# Patient Record
Sex: FEMALE | Race: WHITE
[De-identification: names, ages, dates, MRNs, and addresses within clinical notes are randomized per-mention and may not be internally consistent; named-entity substitution may affect disease eponyms.]

---

## 2017-01-05 ENCOUNTER — HOSPITAL ENCOUNTER (OUTPATIENT)
Dept: HOSPITAL 53 - SKLAB5 | Age: 62
End: 2017-01-05
Attending: FAMILY MEDICINE
Payer: MEDICAID

## 2017-01-05 DIAGNOSIS — R56.9: Primary | ICD-10-CM

## 2017-01-05 DIAGNOSIS — E11.9: ICD-10-CM

## 2017-01-05 DIAGNOSIS — D64.9: ICD-10-CM

## 2017-01-05 LAB
ALBUMIN SERPL BCG-MCNC: 3.5 GM/DL (ref 3.2–5.2)
ALBUMIN/GLOB SERPL: 0.83 {RATIO} (ref 1–1.93)
ALP SERPL-CCNC: 115 U/L (ref 45–117)
ALT SERPL W P-5'-P-CCNC: 23 U/L (ref 12–78)
ANION GAP SERPL CALC-SCNC: 12 MEQ/L (ref 8–16)
AST SERPL-CCNC: 19 U/L (ref 15–37)
BASOPHILS # BLD AUTO: 0 K/MM3 (ref 0–0.2)
BASOPHILS NFR BLD AUTO: 0.7 % (ref 0–1)
BILIRUB SERPL-MCNC: 0.5 MG/DL (ref 0.2–1)
BUN SERPL-MCNC: 12 MG/DL (ref 7–18)
CALCIUM SERPL-MCNC: 9 MG/DL (ref 8.8–10.2)
CHLORIDE SERPL-SCNC: 98 MEQ/L (ref 98–107)
CHOLEST SERPL-MCNC: 146 MG/DL (ref ?–200)
CO2 SERPL-SCNC: 26 MEQ/L (ref 21–32)
CREAT SERPL-MCNC: 0.76 MG/DL (ref 0.55–1.02)
EOSINOPHIL # BLD AUTO: 0.6 K/MM3 (ref 0–0.5)
EOSINOPHIL NFR BLD AUTO: 7.9 % (ref 0–3)
ERYTHROCYTE [DISTWIDTH] IN BLOOD BY AUTOMATED COUNT: 13.4 % (ref 11.5–14.5)
GFR SERPL CREATININE-BSD FRML MDRD: > 60 ML/MIN/{1.73_M2} (ref 45–?)
GLUCOSE SERPL-MCNC: 331 MG/DL (ref 80–110)
LARGE UNSTAINED CELL #: 0.2 K/MM3 (ref 0–0.4)
LARGE UNSTAINED CELL %: 2.9 % (ref 0–4)
LYMPHOCYTES # BLD AUTO: 2.4 K/MM3 (ref 1.5–4.5)
LYMPHOCYTES NFR BLD AUTO: 30.5 % (ref 24–44)
MCH RBC QN AUTO: 29 PG (ref 27–33)
MCHC RBC AUTO-ENTMCNC: 31 G/DL (ref 32–36.5)
MCV RBC AUTO: 93.6 FL (ref 80–96)
MONOCYTES # BLD AUTO: 0.4 K/MM3 (ref 0–0.8)
MONOCYTES NFR BLD AUTO: 5 % (ref 0–5)
NEUTROPHILS # BLD AUTO: 4.2 K/MM3 (ref 1.8–7.7)
NEUTROPHILS NFR BLD AUTO: 53 % (ref 36–66)
PLATELET # BLD AUTO: 220 K/MM3 (ref 150–450)
POTASSIUM SERPL-SCNC: 4.3 MEQ/L (ref 3.5–5.1)
PROT SERPL-MCNC: 7.7 GM/DL (ref 6.4–8.2)
SODIUM SERPL-SCNC: 136 MEQ/L (ref 136–145)
TRIGL SERPL-MCNC: 154 MG/DL (ref ?–150)
WBC # BLD AUTO: 7.9 K/MM3 (ref 4–10)

## 2017-01-27 ENCOUNTER — HOSPITAL ENCOUNTER (OUTPATIENT)
Dept: HOSPITAL 53 - SKLAB5 | Age: 62
Discharge: HOME | End: 2017-01-27
Attending: FAMILY MEDICINE
Payer: MEDICAID

## 2017-01-27 DIAGNOSIS — L03.116: Primary | ICD-10-CM

## 2017-04-04 ENCOUNTER — HOSPITAL ENCOUNTER (OUTPATIENT)
Dept: HOSPITAL 53 - SKLAB5 | Age: 62
End: 2017-04-04
Attending: FAMILY MEDICINE
Payer: MEDICAID

## 2017-04-04 DIAGNOSIS — L03.115: Primary | ICD-10-CM

## 2017-04-04 DIAGNOSIS — M54.42: ICD-10-CM

## 2017-04-04 LAB
ALBUMIN SERPL BCG-MCNC: 3.4 GM/DL (ref 3.2–5.2)
ALBUMIN/GLOB SERPL: 0.83 {RATIO} (ref 1–1.93)
ALP SERPL-CCNC: 124 U/L (ref 45–117)
ALT SERPL W P-5'-P-CCNC: 27 U/L (ref 12–78)
ANION GAP SERPL CALC-SCNC: 8 MEQ/L (ref 8–16)
AST SERPL-CCNC: 14 U/L (ref 15–37)
BASOPHILS # BLD AUTO: 0 K/MM3 (ref 0–0.2)
BASOPHILS NFR BLD AUTO: 0.5 % (ref 0–1)
BILIRUB SERPL-MCNC: 0.3 MG/DL (ref 0.2–1)
BUN SERPL-MCNC: 15 MG/DL (ref 7–18)
CALCIUM SERPL-MCNC: 8.7 MG/DL (ref 8.8–10.2)
CHLORIDE SERPL-SCNC: 100 MEQ/L (ref 98–107)
CO2 SERPL-SCNC: 28 MEQ/L (ref 21–32)
CREAT SERPL-MCNC: 0.81 MG/DL (ref 0.55–1.02)
EOSINOPHIL # BLD AUTO: 0.2 K/MM3 (ref 0–0.5)
EOSINOPHIL NFR BLD AUTO: 2.5 % (ref 0–3)
ERYTHROCYTE [DISTWIDTH] IN BLOOD BY AUTOMATED COUNT: 14 % (ref 11.5–14.5)
EST. AVERAGE GLUCOSE BLD GHB EST-MCNC: 252 MG/DL (ref 60–110)
GFR SERPL CREATININE-BSD FRML MDRD: > 60 ML/MIN/{1.73_M2} (ref 45–?)
GLUCOSE SERPL-MCNC: 380 MG/DL (ref 80–110)
LARGE UNSTAINED CELL #: 0.2 K/MM3 (ref 0–0.4)
LARGE UNSTAINED CELL %: 1.9 % (ref 0–4)
LYMPHOCYTES # BLD AUTO: 3.5 K/MM3 (ref 1.5–4.5)
LYMPHOCYTES NFR BLD AUTO: 38.7 % (ref 24–44)
MCH RBC QN AUTO: 29 PG (ref 27–33)
MCHC RBC AUTO-ENTMCNC: 31.4 G/DL (ref 32–36.5)
MCV RBC AUTO: 92.4 FL (ref 80–96)
MONOCYTES # BLD AUTO: 0.4 K/MM3 (ref 0–0.8)
MONOCYTES NFR BLD AUTO: 4.8 % (ref 0–5)
NEUTROPHILS # BLD AUTO: 4.4 K/MM3 (ref 1.8–7.7)
NEUTROPHILS NFR BLD AUTO: 51.6 % (ref 36–66)
PLATELET # BLD AUTO: 204 K/MM3 (ref 150–450)
POTASSIUM SERPL-SCNC: 4 MEQ/L (ref 3.5–5.1)
PROT SERPL-MCNC: 7.5 GM/DL (ref 6.4–8.2)
SODIUM SERPL-SCNC: 136 MEQ/L (ref 136–145)
WBC # BLD AUTO: 8.5 K/MM3 (ref 4–10)

## 2017-05-13 ENCOUNTER — HOSPITAL ENCOUNTER (EMERGENCY)
Dept: HOSPITAL 53 - M ED | Age: 62
Discharge: HOME | End: 2017-05-13
Payer: MEDICAID

## 2017-05-13 VITALS — BODY MASS INDEX: 31.98 KG/M2 | HEIGHT: 66 IN | WEIGHT: 199 LBS

## 2017-05-13 VITALS — DIASTOLIC BLOOD PRESSURE: 84 MMHG | SYSTOLIC BLOOD PRESSURE: 154 MMHG

## 2017-05-13 DIAGNOSIS — E11.9: ICD-10-CM

## 2017-05-13 DIAGNOSIS — Z79.4: ICD-10-CM

## 2017-05-13 DIAGNOSIS — R04.0: Primary | ICD-10-CM

## 2017-05-13 DIAGNOSIS — I10: ICD-10-CM

## 2017-05-13 DIAGNOSIS — Z79.82: ICD-10-CM

## 2017-05-13 DIAGNOSIS — Z79.891: ICD-10-CM

## 2017-05-13 DIAGNOSIS — I51.9: ICD-10-CM

## 2017-05-13 DIAGNOSIS — Z88.1: ICD-10-CM

## 2017-05-13 DIAGNOSIS — Z79.899: ICD-10-CM

## 2017-05-13 LAB
ANION GAP SERPL CALC-SCNC: 7 MEQ/L (ref 8–16)
BUN SERPL-MCNC: 14 MG/DL (ref 7–18)
CALCIUM SERPL-MCNC: 8.7 MG/DL (ref 8.8–10.2)
CHLORIDE SERPL-SCNC: 101 MEQ/L (ref 98–107)
CO2 SERPL-SCNC: 28 MEQ/L (ref 21–32)
CREAT SERPL-MCNC: 0.7 MG/DL (ref 0.55–1.02)
ERYTHROCYTE [DISTWIDTH] IN BLOOD BY AUTOMATED COUNT: 14.2 % (ref 11.5–14.5)
GFR SERPL CREATININE-BSD FRML MDRD: > 60 ML/MIN/{1.73_M2} (ref 45–?)
GLUCOSE SERPL-MCNC: 274 MG/DL (ref 80–110)
INR PPP: 0.87
MCH RBC QN AUTO: 28.6 PG (ref 27–33)
MCHC RBC AUTO-ENTMCNC: 31.5 G/DL (ref 32–36.5)
MCV RBC AUTO: 90.6 FL (ref 80–96)
PLATELET # BLD AUTO: 181 K/MM3 (ref 150–450)
POTASSIUM SERPL-SCNC: 4.2 MEQ/L (ref 3.5–5.1)
SODIUM SERPL-SCNC: 136 MEQ/L (ref 136–145)
WBC # BLD AUTO: 9.4 K/MM3 (ref 4–10)

## 2017-07-06 ENCOUNTER — HOSPITAL ENCOUNTER (OUTPATIENT)
Dept: HOSPITAL 53 - SKLAB5 | Age: 62
End: 2017-07-06
Attending: FAMILY MEDICINE
Payer: MEDICAID

## 2017-07-06 DIAGNOSIS — R56.9: ICD-10-CM

## 2017-07-06 DIAGNOSIS — E11.9: ICD-10-CM

## 2017-07-06 DIAGNOSIS — E78.5: Primary | ICD-10-CM

## 2017-07-06 LAB
ALBUMIN SERPL BCG-MCNC: 3.4 GM/DL (ref 3.2–5.2)
ALBUMIN/GLOB SERPL: 0.83 {RATIO} (ref 1–1.93)
ALP SERPL-CCNC: 114 U/L (ref 45–117)
ALT SERPL W P-5'-P-CCNC: 30 U/L (ref 12–78)
ANION GAP SERPL CALC-SCNC: 10 MEQ/L (ref 8–16)
AST SERPL-CCNC: 18 U/L (ref 15–37)
BASOPHILS # BLD AUTO: 0 K/MM3 (ref 0–0.2)
BASOPHILS NFR BLD AUTO: 0.6 % (ref 0–1)
BILIRUB SERPL-MCNC: 0.3 MG/DL (ref 0.2–1)
BUN SERPL-MCNC: 15 MG/DL (ref 7–18)
CALCIUM SERPL-MCNC: 8.8 MG/DL (ref 8.8–10.2)
CHLORIDE SERPL-SCNC: 98 MEQ/L (ref 98–107)
CHOLEST SERPL-MCNC: 146 MG/DL (ref ?–200)
CO2 SERPL-SCNC: 26 MEQ/L (ref 21–32)
CREAT SERPL-MCNC: 0.79 MG/DL (ref 0.55–1.02)
EOSINOPHIL # BLD AUTO: 0.2 K/MM3 (ref 0–0.5)
EOSINOPHIL NFR BLD AUTO: 2.9 % (ref 0–3)
ERYTHROCYTE [DISTWIDTH] IN BLOOD BY AUTOMATED COUNT: 14.2 % (ref 11.5–14.5)
EST. AVERAGE GLUCOSE BLD GHB EST-MCNC: 229 MG/DL (ref 60–110)
GFR SERPL CREATININE-BSD FRML MDRD: > 60 ML/MIN/{1.73_M2} (ref 45–?)
GLUCOSE SERPL-MCNC: 354 MG/DL (ref 80–110)
LARGE UNSTAINED CELL #: 0.2 K/MM3 (ref 0–0.4)
LARGE UNSTAINED CELL %: 2.2 % (ref 0–4)
LYMPHOCYTES # BLD AUTO: 2.6 K/MM3 (ref 1.5–4.5)
LYMPHOCYTES NFR BLD AUTO: 31.6 % (ref 24–44)
MCH RBC QN AUTO: 29.7 PG (ref 27–33)
MCHC RBC AUTO-ENTMCNC: 31.8 G/DL (ref 32–36.5)
MCV RBC AUTO: 93.2 FL (ref 80–96)
MONOCYTES # BLD AUTO: 0.4 K/MM3 (ref 0–0.8)
MONOCYTES NFR BLD AUTO: 5.1 % (ref 0–5)
NEUTROPHILS # BLD AUTO: 4.7 K/MM3 (ref 1.8–7.7)
NEUTROPHILS NFR BLD AUTO: 57.6 % (ref 36–66)
PLATELET # BLD AUTO: 202 K/MM3 (ref 150–450)
POTASSIUM SERPL-SCNC: 4.2 MEQ/L (ref 3.5–5.1)
PROT SERPL-MCNC: 7.5 GM/DL (ref 6.4–8.2)
SODIUM SERPL-SCNC: 134 MEQ/L (ref 136–145)
TRIGL SERPL-MCNC: 221 MG/DL (ref ?–150)
WBC # BLD AUTO: 8.1 K/MM3 (ref 4–10)

## 2017-07-27 ENCOUNTER — HOSPITAL ENCOUNTER (OUTPATIENT)
Dept: HOSPITAL 53 - SKLAB5 | Age: 62
End: 2017-07-27
Attending: FAMILY MEDICINE
Payer: MEDICAID

## 2017-07-27 DIAGNOSIS — D64.9: Primary | ICD-10-CM

## 2017-07-27 LAB
BASOPHILS # BLD AUTO: 0 K/MM3 (ref 0–0.2)
BASOPHILS NFR BLD AUTO: 0.5 % (ref 0–1)
EOSINOPHIL # BLD AUTO: 0.2 K/MM3 (ref 0–0.5)
EOSINOPHIL NFR BLD AUTO: 2.1 % (ref 0–3)
ERYTHROCYTE [DISTWIDTH] IN BLOOD BY AUTOMATED COUNT: 14.1 % (ref 11.5–14.5)
LARGE UNSTAINED CELL #: 0.2 K/MM3 (ref 0–0.4)
LARGE UNSTAINED CELL %: 1.9 % (ref 0–4)
LYMPHOCYTES # BLD AUTO: 2.4 K/MM3 (ref 1.5–4.5)
LYMPHOCYTES NFR BLD AUTO: 26.3 % (ref 24–44)
MCH RBC QN AUTO: 29.3 PG (ref 27–33)
MCHC RBC AUTO-ENTMCNC: 32 G/DL (ref 32–36.5)
MCV RBC AUTO: 91.4 FL (ref 80–96)
MONOCYTES # BLD AUTO: 0.4 K/MM3 (ref 0–0.8)
MONOCYTES NFR BLD AUTO: 4.7 % (ref 0–5)
NEUTROPHILS # BLD AUTO: 5.9 K/MM3 (ref 1.8–7.7)
NEUTROPHILS NFR BLD AUTO: 64.4 % (ref 36–66)
PLATELET # BLD AUTO: 198 K/MM3 (ref 150–450)
WBC # BLD AUTO: 9.2 K/MM3 (ref 4–10)

## 2017-08-01 ENCOUNTER — HOSPITAL ENCOUNTER (OUTPATIENT)
Dept: HOSPITAL 53 - SKLAB5 | Age: 62
End: 2017-08-01
Attending: FAMILY MEDICINE
Payer: MEDICAID

## 2017-08-01 ENCOUNTER — HOSPITAL ENCOUNTER (OUTPATIENT)
Dept: HOSPITAL 53 - M RAD | Age: 62
End: 2017-08-01
Attending: FAMILY MEDICINE
Payer: MEDICAID

## 2017-08-01 DIAGNOSIS — Z12.31: Primary | ICD-10-CM

## 2017-08-01 DIAGNOSIS — R10.9: Primary | ICD-10-CM

## 2017-08-01 LAB
BASOPHILS # BLD AUTO: 0 K/MM3 (ref 0–0.2)
BASOPHILS NFR BLD AUTO: 0.5 % (ref 0–1)
EOSINOPHIL # BLD AUTO: 0.2 K/MM3 (ref 0–0.5)
EOSINOPHIL NFR BLD AUTO: 2.6 % (ref 0–3)
ERYTHROCYTE [DISTWIDTH] IN BLOOD BY AUTOMATED COUNT: 14.2 % (ref 11.5–14.5)
LARGE UNSTAINED CELL #: 0.2 K/MM3 (ref 0–0.4)
LARGE UNSTAINED CELL %: 2.5 % (ref 0–4)
LYMPHOCYTES # BLD AUTO: 3.1 K/MM3 (ref 1.5–4.5)
LYMPHOCYTES NFR BLD AUTO: 34.7 % (ref 24–44)
MCH RBC QN AUTO: 29.4 PG (ref 27–33)
MCHC RBC AUTO-ENTMCNC: 32.3 G/DL (ref 32–36.5)
MCV RBC AUTO: 90.9 FL (ref 80–96)
MONOCYTES # BLD AUTO: 0.5 K/MM3 (ref 0–0.8)
MONOCYTES NFR BLD AUTO: 5 % (ref 0–5)
NEUTROPHILS # BLD AUTO: 4.9 K/MM3 (ref 1.8–7.7)
NEUTROPHILS NFR BLD AUTO: 54.6 % (ref 36–66)
PLATELET # BLD AUTO: 194 K/MM3 (ref 150–450)
WBC # BLD AUTO: 9 K/MM3 (ref 4–10)

## 2017-08-01 NOTE — REPMRS
Patient History

The patient states she has not had a clinical breast exam in over

a year. No known family history of cancer.

 

Digital Mammo Screening Bilat: August 1, 2017 - Exam #: 

[id number]

Bilateral CC and MLO view(s) were taken.

 

Technologist: Laura Pearl, Technologist

No prior studies available for comparison.

 

FINDINGS: There are scattered fibroglandular densities.  There is

a mild amount of residual fibroglandular tissue which is fairly 

symmetric. There is no dominant mass, architectural distortion, 

or clustered microcalcification suggestive of malignancy. Minor 

tissue asymmetry in right breast as benign finding. Priors from 

2002 at [facility] no longer available.

 

ASSESSMENT: BI-RADS/ACR category 2 mammogram. Benign finding(s).

 

Recommendation

Routine screening mammogram in 1 year (for women over age 40).

This mammogram was interpreted with the aid of an FDA-approved 

computer-aided dectection system.

A. Negative x-ray reports should not delay biopsy if a dominant 

or clinically suspicious mass is present.

B. Four to eight percent of cancers are not identified by 

mammography.

C. Adenosis and dense breast may obscure an underlying neoplasm.

 

Electronically Signed By: Rhys Bond MD 08/01/17 0162

## 2017-08-02 ENCOUNTER — HOSPITAL ENCOUNTER (OUTPATIENT)
Dept: HOSPITAL 53 - SKLAB5 | Age: 62
End: 2017-08-02
Attending: FAMILY MEDICINE
Payer: MEDICAID

## 2017-08-02 DIAGNOSIS — R10.9: Primary | ICD-10-CM

## 2017-08-02 NOTE — REP
KUB, ONE VIEW:

 

HISTORY:  Abdominal pain.

 

COMPARISON:  06/08/2011.

 

A small amount of air is present in the small and large intestine.  There are no

fluid levels or dilated loops of intestine.  There is no pneumoperitoneum.

Degenerative change is present in the lumbar spine.

 

IMPRESSION:

 

Nonspecific bowel gas pattern.

 

 

Signed by

Prince Brunson MD 08/02/2017 10:34 A

## 2017-08-15 ENCOUNTER — HOSPITAL ENCOUNTER (OUTPATIENT)
Dept: HOSPITAL 53 - SKLAB5 | Age: 62
End: 2017-08-15
Attending: FAMILY MEDICINE
Payer: MEDICAID

## 2017-08-15 DIAGNOSIS — R31.9: Primary | ICD-10-CM

## 2017-09-03 ENCOUNTER — HOSPITAL ENCOUNTER (OUTPATIENT)
Dept: HOSPITAL 53 - SKLAB5 | Age: 62
End: 2017-09-03
Attending: FAMILY MEDICINE
Payer: MEDICAID

## 2017-09-03 DIAGNOSIS — I10: ICD-10-CM

## 2017-09-03 DIAGNOSIS — E11.9: Primary | ICD-10-CM

## 2017-09-11 ENCOUNTER — HOSPITAL ENCOUNTER (OUTPATIENT)
Dept: HOSPITAL 53 - SKLAB5 | Age: 62
End: 2017-09-11
Attending: FAMILY MEDICINE
Payer: MEDICAID

## 2017-09-11 ENCOUNTER — HOSPITAL ENCOUNTER (OUTPATIENT)
Dept: HOSPITAL 53 - M RAD | Age: 62
End: 2017-09-11
Attending: NURSE PRACTITIONER
Payer: MEDICAID

## 2017-09-11 DIAGNOSIS — M79.661: Primary | ICD-10-CM

## 2017-09-11 DIAGNOSIS — M79.89: ICD-10-CM

## 2017-09-11 DIAGNOSIS — R79.89: Primary | ICD-10-CM

## 2017-09-11 LAB
ERYTHROCYTE [DISTWIDTH] IN BLOOD BY AUTOMATED COUNT: 14.1 % (ref 11.5–14.5)
ERYTHROCYTE [SEDIMENTATION RATE] IN BLOOD BY WESTERGREN METHOD: 19 MM/HR (ref 0–30)
MCH RBC QN AUTO: 29.5 PG (ref 27–33)
MCHC RBC AUTO-ENTMCNC: 32.3 G/DL (ref 32–36.5)
MCV RBC AUTO: 91.3 FL (ref 80–96)
PLATELET # BLD AUTO: 202 K/MM3 (ref 150–450)
WBC # BLD AUTO: 8.8 K/MM3 (ref 4–10)

## 2017-09-11 NOTE — REP
RIGHT LOWER EXTREMITY DOPPLER VENOUS ULTRASOUND:  09/11/2017.

 

Comparison: 02/05/2015, 04/05/2014.

 

Technique:  The deep venous system of the right lower extremity is evaluated with

gray scale imaging, compression ultrasound, color imaging and duplex Doppler

interrogation.  Examination from the groin through the popliteal fossa into the

proximal calf. Exam is limited due to significant lower extremity swelling.  The

distal SFV is difficult to visualize

 

Findings: There is full compressibility from the common femoral vein in the

inguinal region through the popliteal vein.  Color imaging confirms patency

throughout the course of the deep venous system.  There is respiratory variation

and augmented flow at all levels, although there is difficulty visualizing the

distal femoral vein above the popliteal due to swelling.

 

Impression:

1.  No Doppler venous ultrasound evidence of DVT in the right lower extremity,

exam limited for reasons stated above.  No gross evidence of DVT.

 

 

Signed by

Rhys Bond MD 09/11/2017 02:32 P

## 2017-10-05 ENCOUNTER — HOSPITAL ENCOUNTER (OUTPATIENT)
Dept: HOSPITAL 53 - SKLAB5 | Age: 62
End: 2017-10-05
Attending: FAMILY MEDICINE
Payer: MEDICAID

## 2017-10-05 DIAGNOSIS — E11.9: Primary | ICD-10-CM

## 2017-10-05 LAB — EST. AVERAGE GLUCOSE BLD GHB EST-MCNC: 226 MG/DL (ref 60–110)

## 2017-11-24 ENCOUNTER — HOSPITAL ENCOUNTER (OUTPATIENT)
Dept: HOSPITAL 53 - SKLAB5 | Age: 62
End: 2017-11-24
Attending: FAMILY MEDICINE
Payer: MEDICAID

## 2017-11-24 DIAGNOSIS — R60.9: ICD-10-CM

## 2017-11-24 DIAGNOSIS — I73.9: Primary | ICD-10-CM

## 2017-11-24 LAB
ANION GAP SERPL CALC-SCNC: 9 MEQ/L (ref 8–16)
BUN SERPL-MCNC: 14 MG/DL (ref 7–18)
CALCIUM SERPL-MCNC: 8.9 MG/DL (ref 8.8–10.2)
CHLORIDE SERPL-SCNC: 102 MEQ/L (ref 98–107)
CO2 SERPL-SCNC: 26 MEQ/L (ref 21–32)
CREAT SERPL-MCNC: 0.72 MG/DL (ref 0.55–1.02)
GFR SERPL CREATININE-BSD FRML MDRD: > 60 ML/MIN/{1.73_M2} (ref 45–?)
GLUCOSE SERPL-MCNC: 250 MG/DL (ref 80–110)
POTASSIUM SERPL-SCNC: 4.3 MEQ/L (ref 3.5–5.1)
SODIUM SERPL-SCNC: 137 MEQ/L (ref 136–145)

## 2017-12-01 ENCOUNTER — HOSPITAL ENCOUNTER (OUTPATIENT)
Dept: HOSPITAL 53 - SKLAB5 | Age: 62
End: 2017-12-01
Attending: FAMILY MEDICINE
Payer: MEDICAID

## 2017-12-01 DIAGNOSIS — E11.9: Primary | ICD-10-CM

## 2017-12-05 ENCOUNTER — HOSPITAL ENCOUNTER (EMERGENCY)
Dept: HOSPITAL 53 - M ED | Age: 62
Discharge: HOME | End: 2017-12-05
Payer: MEDICAID

## 2017-12-05 VITALS — DIASTOLIC BLOOD PRESSURE: 70 MMHG | SYSTOLIC BLOOD PRESSURE: 131 MMHG

## 2017-12-05 VITALS — WEIGHT: 197.42 LBS | HEIGHT: 64 IN | BODY MASS INDEX: 33.7 KG/M2

## 2017-12-05 DIAGNOSIS — Z79.82: ICD-10-CM

## 2017-12-05 DIAGNOSIS — Z79.4: ICD-10-CM

## 2017-12-05 DIAGNOSIS — R04.0: Primary | ICD-10-CM

## 2017-12-05 DIAGNOSIS — M19.90: ICD-10-CM

## 2017-12-05 DIAGNOSIS — Z95.1: ICD-10-CM

## 2017-12-05 DIAGNOSIS — G81.91: ICD-10-CM

## 2017-12-05 DIAGNOSIS — I25.10: ICD-10-CM

## 2017-12-05 DIAGNOSIS — Z79.899: ICD-10-CM

## 2017-12-05 DIAGNOSIS — Z88.8: ICD-10-CM

## 2017-12-05 DIAGNOSIS — R56.9: ICD-10-CM

## 2017-12-05 DIAGNOSIS — I25.2: ICD-10-CM

## 2017-12-05 DIAGNOSIS — J45.909: ICD-10-CM

## 2017-12-05 DIAGNOSIS — Z87.442: ICD-10-CM

## 2017-12-05 DIAGNOSIS — F31.9: ICD-10-CM

## 2017-12-05 DIAGNOSIS — E11.9: ICD-10-CM

## 2017-12-05 DIAGNOSIS — I10: ICD-10-CM

## 2017-12-05 LAB
ANION GAP SERPL CALC-SCNC: 7 MEQ/L (ref 8–16)
BASOPHILS # BLD AUTO: 0 10^3/UL (ref 0–0.2)
BASOPHILS NFR BLD AUTO: 0.4 % (ref 0–1)
BUN SERPL-MCNC: 18 MG/DL (ref 7–18)
CALCIUM SERPL-MCNC: 9.1 MG/DL (ref 8.8–10.2)
CHLORIDE SERPL-SCNC: 102 MEQ/L (ref 98–107)
CO2 SERPL-SCNC: 30 MEQ/L (ref 21–32)
CREAT SERPL-MCNC: 0.71 MG/DL (ref 0.55–1.02)
EOSINOPHIL # BLD AUTO: 0.2 10^3/UL (ref 0–0.5)
EOSINOPHIL NFR BLD AUTO: 2 % (ref 0–3)
ERYTHROCYTE [DISTWIDTH] IN BLOOD BY AUTOMATED COUNT: 14.4 % (ref 11.5–14.5)
GFR SERPL CREATININE-BSD FRML MDRD: > 60 ML/MIN/{1.73_M2} (ref 45–?)
GLUCOSE SERPL-MCNC: 126 MG/DL (ref 80–110)
IMM GRANULOCYTES NFR BLD: 0.4 % (ref 0–0)
INR PPP: 0.92
LYMPHOCYTES # BLD AUTO: 3.3 10^3/UL (ref 1.5–4.5)
LYMPHOCYTES NFR BLD AUTO: 31.4 % (ref 24–44)
MCH RBC QN AUTO: 28.6 PG (ref 27–33)
MCHC RBC AUTO-ENTMCNC: 32.1 G/DL (ref 32–36.5)
MCV RBC AUTO: 89.3 FL (ref 80–96)
MONOCYTES # BLD AUTO: 0.7 10^3/UL (ref 0–0.8)
MONOCYTES NFR BLD AUTO: 7 % (ref 0–5)
NEUTROPHILS # BLD AUTO: 6.1 10^3/UL (ref 1.8–7.7)
NEUTROPHILS NFR BLD AUTO: 58.8 % (ref 36–66)
NRBC BLD AUTO-RTO: 0 % (ref 0–0)
PLATELET # BLD AUTO: 199 10^3/UL (ref 150–450)
POTASSIUM SERPL-SCNC: 4.1 MEQ/L (ref 3.5–5.1)
SODIUM SERPL-SCNC: 139 MEQ/L (ref 136–145)
WBC # BLD AUTO: 10.4 10^3/UL (ref 4–10)

## 2018-01-04 ENCOUNTER — HOSPITAL ENCOUNTER (OUTPATIENT)
Dept: HOSPITAL 53 - SKLAB5 | Age: 63
End: 2018-01-04
Attending: FAMILY MEDICINE
Payer: MEDICAID

## 2018-01-04 DIAGNOSIS — J44.9: Primary | ICD-10-CM

## 2018-01-04 DIAGNOSIS — R56.9: ICD-10-CM

## 2018-01-04 DIAGNOSIS — E11.9: ICD-10-CM

## 2018-01-04 LAB
ALBUMIN/GLOBULIN RATIO: 0.8 (ref 1–1.93)
ALBUMIN: 3.5 GM/DL (ref 3.2–5.2)
ALKALINE PHOSPHATASE: 91 U/L (ref 45–117)
ALT SERPL W P-5'-P-CCNC: 25 U/L (ref 12–78)
ANION GAP: 10 MEQ/L (ref 8–16)
AST SERPL-CCNC: 26 U/L (ref 7–37)
BASO #: 0.1 10^3/UL (ref 0–0.2)
BASO %: 0.6 % (ref 0–1)
BILIRUBIN,TOTAL: 0.2 MG/DL (ref 0.2–1)
BLOOD UREA NITROGEN: 15 MG/DL (ref 7–18)
CALCIUM LEVEL: 9.1 MG/DL (ref 8.8–10.2)
CARBON DIOXIDE LEVEL: 26 MEQ/L (ref 21–32)
CHLORIDE LEVEL: 102 MEQ/L (ref 98–107)
CHOLEST SERPL-MCNC: 123 MG/DL (ref ?–200)
CHOLESTEROL RISK RATIO: 2.62 (ref ?–5)
CREATININE FOR GFR: 0.68 MG/DL (ref 0.55–1.02)
EOS #: 0.3 10^3/UL (ref 0–0.5)
EOSINOPHIL NFR BLD AUTO: 3.3 % (ref 0–3)
EST. AVERAGE GLUCOSE BLD GHB EST-MCNC: 177 MG/DL (ref 60–110)
GFR SERPL CREATININE-BSD FRML MDRD: > 60 ML/MIN/{1.73_M2} (ref 45–?)
GLUCOSE, FASTING: 226 MG/DL (ref 80–110)
HDLC SERPL-MCNC: 47 MG/DL (ref 40–?)
HEMATOCRIT: 40.5 % (ref 36–47)
HEMOGLOBIN: 12.8 G/DL (ref 12–16)
IMMATURE GRANULOCYTE #: 0.1 10^3/UL (ref 0–0)
IMMATURE GRANULOCYTE %: 0.7 % (ref 0–0)
LDL CHOLESTEROL: 50.2 MG/DL (ref ?–100)
LYMPH #: 3.9 10^3/UL (ref 1.5–4.5)
LYMPH %: 38.6 % (ref 24–44)
MEAN CORPUSCULAR HEMOGLOBIN: 27.7 PG (ref 27–33)
MEAN CORPUSCULAR HGB CONC: 31.6 G/DL (ref 32–36.5)
MEAN CORPUSCULAR VOLUME: 87.7 FL (ref 80–96)
MONO #: 0.5 10^3/UL (ref 0–0.8)
MONO %: 5 % (ref 0–5)
NEUTROPHILS #: 5.2 10^3/UL (ref 1.8–7.7)
NEUTROPHILS %: 51.8 % (ref 36–66)
NONHDLC SERPL-MCNC: 76 MG/DL
NRBC BLD AUTO-RTO: 0 % (ref 0–0)
PLATELET COUNT, AUTOMATED: 260 10^3/UL (ref 150–450)
POTASSIUM SERUM: 4.3 MEQ/L (ref 3.5–5.1)
RED BLOOD COUNT: 4.62 10^6/UL (ref 4–5.4)
RED CELL DISTRIBUTION WIDTH: 14.4 % (ref 11.5–14.5)
SODIUM LEVEL: 138 MEQ/L (ref 136–145)
TOTAL PROTEIN: 7.9 GM/DL (ref 6.4–8.2)
TRIGLYCERIDES LEVEL: 129 MG/DL (ref ?–150)
WHITE BLOOD COUNT: 10.1 10^3/UL (ref 4–10)

## 2018-01-04 PROCEDURE — 80053 COMPREHEN METABOLIC PANEL: CPT

## 2018-04-05 ENCOUNTER — HOSPITAL ENCOUNTER (OUTPATIENT)
Dept: HOSPITAL 53 - SKLAB5 | Age: 63
End: 2018-04-05
Attending: FAMILY MEDICINE
Payer: MEDICAID

## 2018-04-05 DIAGNOSIS — E11.9: ICD-10-CM

## 2018-04-05 DIAGNOSIS — R56.9: Primary | ICD-10-CM

## 2018-04-05 DIAGNOSIS — I25.10: ICD-10-CM

## 2018-04-05 LAB
EST. AVERAGE GLUCOSE BLD GHB EST-MCNC: 180 MG/DL (ref 60–110)
VALPROIC ACID (DEPAKOTE): 85.3 UG/ML (ref 50–100)

## 2018-04-05 PROCEDURE — 80164 ASSAY DIPROPYLACETIC ACD TOT: CPT

## 2018-04-23 ENCOUNTER — HOSPITAL ENCOUNTER (OUTPATIENT)
Dept: HOSPITAL 53 - SKLAB5 | Age: 63
End: 2018-04-23
Attending: FAMILY MEDICINE
Payer: MEDICAID

## 2018-04-23 DIAGNOSIS — R19.7: Primary | ICD-10-CM

## 2018-04-23 PROCEDURE — 87507 IADNA-DNA/RNA PROBE TQ 12-25: CPT

## 2018-06-26 ENCOUNTER — HOSPITAL ENCOUNTER (OUTPATIENT)
Dept: HOSPITAL 53 - SKLAB5 | Age: 63
End: 2018-06-26
Attending: FAMILY MEDICINE
Payer: MEDICAID

## 2018-06-26 DIAGNOSIS — M25.431: Primary | ICD-10-CM

## 2018-06-26 LAB
BASO #: 0 10^3/UL (ref 0–0.2)
BASO %: 0.3 % (ref 0–1)
CRP SERPL-MCNC: 1.5 MG/DL (ref 0–0.3)
EOS #: 0 10^3/UL (ref 0–0.5)
EOSINOPHIL NFR BLD AUTO: 0 % (ref 0–3)
ERYTHROCYTE SEDIMENTATION RATE: 14 MM/HR (ref 0–30)
HEMATOCRIT: 42.1 % (ref 36–47)
HEMOGLOBIN: 13.2 G/DL (ref 12–15.5)
IMMATURE GRANULOCYTE %: 0.6 % (ref 0–3)
LYMPH #: 2.5 10^3/UL (ref 1.5–4.5)
LYMPH %: 16.2 % (ref 24–44)
MEAN CORPUSCULAR HEMOGLOBIN: 27.3 PG (ref 27–33)
MEAN CORPUSCULAR HGB CONC: 31.4 G/DL (ref 32–36.5)
MEAN CORPUSCULAR VOLUME: 87 FL (ref 80–96)
MONO #: 0.7 10^3/UL (ref 0–0.8)
MONO %: 4.5 % (ref 0–5)
NEUTROPHILS #: 12.1 10^3/UL (ref 1.8–7.7)
NEUTROPHILS %: 78.4 % (ref 36–66)
NRBC BLD AUTO-RTO: 0 % (ref 0–0)
PLATELET COUNT, AUTOMATED: 238 10^3/UL (ref 150–450)
RED BLOOD COUNT: 4.84 10^6/UL (ref 4–5.4)
RED CELL DISTRIBUTION WIDTH: 15.6 % (ref 11.5–14.5)
URIC ACID: 5.6 MG/DL (ref 2.6–6)
WHITE BLOOD COUNT: 15.5 10^3/UL (ref 4–10)

## 2018-06-26 PROCEDURE — 84550 ASSAY OF BLOOD/URIC ACID: CPT

## 2018-06-26 RX ADMIN — LIDOCAINE HYDROCHLORIDE 1 ML: 10 INJECTION, SOLUTION INFILTRATION; PERINEURAL at 22:00

## 2018-06-27 ENCOUNTER — HOSPITAL ENCOUNTER (INPATIENT)
Dept: HOSPITAL 53 - M MS5PR | Age: 63
LOS: 9 days | Discharge: NURSING, REHAB OR LONG-TERM CARE | DRG: 720 | End: 2018-07-06
Attending: FAMILY MEDICINE
Payer: MEDICAID

## 2018-06-27 DIAGNOSIS — Z79.4: ICD-10-CM

## 2018-06-27 DIAGNOSIS — A41.9: Primary | ICD-10-CM

## 2018-06-27 DIAGNOSIS — E78.5: ICD-10-CM

## 2018-06-27 DIAGNOSIS — Z79.899: ICD-10-CM

## 2018-06-27 DIAGNOSIS — L03.113: ICD-10-CM

## 2018-06-27 DIAGNOSIS — I69.951: ICD-10-CM

## 2018-06-27 DIAGNOSIS — G40.909: ICD-10-CM

## 2018-06-27 DIAGNOSIS — R65.20: ICD-10-CM

## 2018-06-27 DIAGNOSIS — F03.90: ICD-10-CM

## 2018-06-27 DIAGNOSIS — I69.322: ICD-10-CM

## 2018-06-27 DIAGNOSIS — J44.9: ICD-10-CM

## 2018-06-27 DIAGNOSIS — R91.8: ICD-10-CM

## 2018-06-27 DIAGNOSIS — L97.929: ICD-10-CM

## 2018-06-27 DIAGNOSIS — E11.40: ICD-10-CM

## 2018-06-27 DIAGNOSIS — Z88.8: ICD-10-CM

## 2018-06-27 DIAGNOSIS — I10: ICD-10-CM

## 2018-06-27 DIAGNOSIS — F31.9: ICD-10-CM

## 2018-06-27 LAB
ALBUMIN/GLOBULIN RATIO: 0.62 (ref 1–1.93)
ALBUMIN: 3.2 GM/DL (ref 3.2–5.2)
ALKALINE PHOSPHATASE: 82 U/L (ref 45–117)
ALT SERPL W P-5'-P-CCNC: 20 U/L (ref 12–78)
ANION GAP: 8 MEQ/L (ref 8–16)
AST SERPL-CCNC: 12 U/L (ref 7–37)
BASO #: 0 10^3/UL (ref 0–0.2)
BASO %: 0.2 % (ref 0–1)
BILIRUBIN,TOTAL: 0.5 MG/DL (ref 0.2–1)
BLOOD UREA NITROGEN: 21 MG/DL (ref 7–18)
CALCIUM LEVEL: 8.9 MG/DL (ref 8.8–10.2)
CARBON DIOXIDE LEVEL: 29 MEQ/L (ref 21–32)
CHLORIDE LEVEL: 99 MEQ/L (ref 98–107)
CREATININE FOR GFR: 0.82 MG/DL (ref 0.55–1.3)
CRP SERPL-MCNC: 9.92 MG/DL (ref 0–0.3)
EOS #: 0.1 10^3/UL (ref 0–0.5)
EOSINOPHIL NFR BLD AUTO: 0.4 % (ref 0–3)
ERYTHROCYTE SEDIMENTATION RATE: 34 MM/HR (ref 0–30)
GFR SERPL CREATININE-BSD FRML MDRD: > 60 ML/MIN/{1.73_M2} (ref 45–?)
GLUCOSE BLDC GLUCOMTR-MCNC: 199 MG/DL (ref 80–115)
GLUCOSE, FASTING: 175 MG/DL (ref 70–100)
HEMATOCRIT: 42.9 % (ref 36–47)
HEMOGLOBIN: 13.7 G/DL (ref 12–15.5)
IMMATURE GRANULOCYTE %: 0.4 % (ref 0–3)
LACTIC ACID SEPSIS PROTOCOL: 1.8 MMOL/L (ref 0.4–2)
LACTIC ACID SEPSIS PROTOCOL: 2.6 MMOL/L (ref 0.4–2)
LEUKOCYTE ESTERASE UR AUTO RFX: NEGATIVE
LYMPH #: 2.7 10^3/UL (ref 1.5–4.5)
LYMPH %: 14.7 % (ref 24–44)
MEAN CORPUSCULAR HEMOGLOBIN: 27 PG (ref 27–33)
MEAN CORPUSCULAR HGB CONC: 31.9 G/DL (ref 32–36.5)
MEAN CORPUSCULAR VOLUME: 84.6 FL (ref 80–96)
MONO #: 1.2 10^3/UL (ref 0–0.8)
MONO %: 6.7 % (ref 0–5)
NEUTROPHILS #: 14.1 10^3/UL (ref 1.8–7.7)
NEUTROPHILS %: 77.6 % (ref 36–66)
NRBC BLD AUTO-RTO: 0 % (ref 0–0)
PLATELET COUNT, AUTOMATED: 219 10^3/UL (ref 150–450)
POTASSIUM SERUM: 3.8 MEQ/L (ref 3.5–5.1)
RED BLOOD COUNT: 5.07 10^6/UL (ref 4–5.4)
RED CELL DISTRIBUTION WIDTH: 15.9 % (ref 11.5–14.5)
SODIUM LEVEL: 136 MEQ/L (ref 136–145)
SPECIFIC GRAVITY UR AUTO RFX: 1.02 (ref 1–1.03)
SQUAM EPITHELIAL CELL UR AURFX: 0 /HPF (ref 0–6)
TOTAL PROTEIN: 8.4 GM/DL (ref 6.4–8.2)
WHITE BLOOD COUNT: 18.2 10^3/UL (ref 4–10)

## 2018-06-27 RX ADMIN — ONDANSETRON 1 MG: 2 INJECTION INTRAMUSCULAR; INTRAVENOUS at 12:47

## 2018-06-27 RX ADMIN — HYDROMORPHONE HYDROCHLORIDE 1 MG: 1 INJECTION, SOLUTION INTRAMUSCULAR; INTRAVENOUS; SUBCUTANEOUS at 14:56

## 2018-06-27 RX ADMIN — SODIUM CHLORIDE 1 MLS/HR: 9 INJECTION, SOLUTION INTRAVENOUS at 12:47

## 2018-06-27 RX ADMIN — SODIUM CHLORIDE 1 MLS/HR: 9 INJECTION, SOLUTION INTRAVENOUS at 18:25

## 2018-06-27 RX ADMIN — MORPHINE SULFATE 1 MG: 4 INJECTION INTRAVENOUS at 18:25

## 2018-06-27 RX ADMIN — DEXTROSE MONOHYDRATE 1 MLS/HR: 5 INJECTION INTRAVENOUS at 23:41

## 2018-06-27 RX ADMIN — DIVALPROEX SODIUM 1 MG: 500 TABLET, DELAYED RELEASE ORAL at 23:40

## 2018-06-27 RX ADMIN — DOCUSATE SODIUM,SENNOSIDES 1 TAB: 50; 8.6 TABLET, FILM COATED ORAL at 23:40

## 2018-06-27 RX ADMIN — Medication 1 DOSE: at 21:00

## 2018-06-27 RX ADMIN — DEXTROSE MONOHYDRATE 1 MLS/HR: 5 INJECTION INTRAVENOUS at 13:57

## 2018-06-27 RX ADMIN — BACLOFEN 1 MG: 10 TABLET ORAL at 23:40

## 2018-06-27 RX ADMIN — HYDROMORPHONE HYDROCHLORIDE 1 MG: 1 INJECTION, SOLUTION INTRAMUSCULAR; INTRAVENOUS; SUBCUTANEOUS at 12:48

## 2018-06-27 RX ADMIN — DEXTROSE MONOHYDRATE 1 MLS/HR: 50 INJECTION, SOLUTION INTRAVENOUS at 18:51

## 2018-06-27 RX ADMIN — INSULIN DETEMIR 1 UNITS: 100 INJECTION, SOLUTION SUBCUTANEOUS at 19:39

## 2018-06-27 RX ADMIN — INSULIN LISPRO 1 UNITS: 100 INJECTION, SOLUTION INTRAVENOUS; SUBCUTANEOUS at 21:00

## 2018-06-27 RX ADMIN — Medication 1 MG: at 23:41

## 2018-06-27 RX ADMIN — MORPHINE SULFATE 1 MG: 4 INJECTION INTRAVENOUS at 20:24

## 2018-06-28 LAB
ANION GAP: 8 MEQ/L (ref 8–16)
BASO #: 0 10^3/UL (ref 0–0.2)
BASO %: 0.2 % (ref 0–1)
BLOOD UREA NITROGEN: 18 MG/DL (ref 7–18)
CALCIUM LEVEL: 7.8 MG/DL (ref 8.8–10.2)
CARBON DIOXIDE LEVEL: 28 MEQ/L (ref 21–32)
CHLORIDE LEVEL: 102 MEQ/L (ref 98–107)
CREATININE FOR GFR: 0.79 MG/DL (ref 0.55–1.3)
EOS #: 0.3 10^3/UL (ref 0–0.5)
EOSINOPHIL NFR BLD AUTO: 1.7 % (ref 0–3)
GFR SERPL CREATININE-BSD FRML MDRD: > 60 ML/MIN/{1.73_M2} (ref 45–?)
GLUCOSE BLDC GLUCOMTR-MCNC: 182 MG/DL (ref 80–115)
GLUCOSE BLDC GLUCOMTR-MCNC: 199 MG/DL (ref 80–115)
GLUCOSE BLDC GLUCOMTR-MCNC: 233 MG/DL (ref 80–115)
GLUCOSE BLDC GLUCOMTR-MCNC: 252 MG/DL (ref 80–115)
GLUCOSE, FASTING: 181 MG/DL (ref 70–100)
HEMATOCRIT: 38 % (ref 36–47)
HEMOGLOBIN: 11.9 G/DL (ref 12–15.5)
IMMATURE GRANULOCYTE %: 0.9 % (ref 0–3)
LYMPH #: 0.9 10^3/UL (ref 1.5–4.5)
LYMPH %: 5.8 % (ref 24–44)
MEAN CORPUSCULAR HEMOGLOBIN: 27 PG (ref 27–33)
MEAN CORPUSCULAR HGB CONC: 31.3 G/DL (ref 32–36.5)
MEAN CORPUSCULAR VOLUME: 86.4 FL (ref 80–96)
MONO #: 0.9 10^3/UL (ref 0–0.8)
MONO %: 5.3 % (ref 0–5)
NEUTROPHILS #: 14.1 10^3/UL (ref 1.8–7.7)
NEUTROPHILS %: 86.1 % (ref 36–66)
NRBC BLD AUTO-RTO: 0 % (ref 0–0)
PLATELET COUNT, AUTOMATED: 182 10^3/UL (ref 150–450)
POTASSIUM SERUM: 3.6 MEQ/L (ref 3.5–5.1)
RED BLOOD COUNT: 4.4 10^6/UL (ref 4–5.4)
RED CELL DISTRIBUTION WIDTH: 15.9 % (ref 11.5–14.5)
SODIUM LEVEL: 138 MEQ/L (ref 136–145)
WHITE BLOOD COUNT: 16.3 10^3/UL (ref 4–10)

## 2018-06-28 RX ADMIN — ENOXAPARIN SODIUM 1 MG: 40 INJECTION SUBCUTANEOUS at 09:12

## 2018-06-28 RX ADMIN — ACETAMINOPHEN 1 MG: 325 TABLET ORAL at 11:36

## 2018-06-28 RX ADMIN — DOCUSATE SODIUM,SENNOSIDES 1 TAB: 50; 8.6 TABLET, FILM COATED ORAL at 09:12

## 2018-06-28 RX ADMIN — FLUTICASONE PROPIONATE AND SALMETEROL XINAFOATE 1 PUFF: 115; 21 AEROSOL, METERED RESPIRATORY (INHALATION) at 07:34

## 2018-06-28 RX ADMIN — FAMOTIDINE 1 MG: 20 TABLET, FILM COATED ORAL at 09:11

## 2018-06-28 RX ADMIN — MORPHINE SULFATE 1 MG: 15 TABLET, EXTENDED RELEASE ORAL at 00:24

## 2018-06-28 RX ADMIN — Medication 1 DOSE: at 22:42

## 2018-06-28 RX ADMIN — DOCUSATE SODIUM,SENNOSIDES 1 TAB: 50; 8.6 TABLET, FILM COATED ORAL at 22:42

## 2018-06-28 RX ADMIN — INSULIN DETEMIR 1 UNITS: 100 INJECTION, SOLUTION SUBCUTANEOUS at 09:13

## 2018-06-28 RX ADMIN — CLOPIDOGREL BISULFATE 1 MG: 75 TABLET ORAL at 09:12

## 2018-06-28 RX ADMIN — INSULIN LISPRO 8 UNITS: 100 INJECTION, SOLUTION INTRAVENOUS; SUBCUTANEOUS at 11:36

## 2018-06-28 RX ADMIN — DEXTROSE MONOHYDRATE 1 MLS/HR: 5 INJECTION INTRAVENOUS at 22:42

## 2018-06-28 RX ADMIN — Medication 1 DOSE: at 17:01

## 2018-06-28 RX ADMIN — DULOXETINE HYDROCHLORIDE 1 MG: 30 CAPSULE, DELAYED RELEASE ORAL at 09:11

## 2018-06-28 RX ADMIN — Medication 1 DOSE: at 12:56

## 2018-06-28 RX ADMIN — Medication 1 MG: at 09:11

## 2018-06-28 RX ADMIN — DEXTROSE MONOHYDRATE 1 MLS/HR: 5 INJECTION INTRAVENOUS at 16:59

## 2018-06-28 RX ADMIN — PREGABALIN 1 MG: 50 CAPSULE ORAL at 16:59

## 2018-06-28 RX ADMIN — DEXTROSE MONOHYDRATE 1 MLS/HR: 50 INJECTION, SOLUTION INTRAVENOUS at 12:56

## 2018-06-28 RX ADMIN — FUROSEMIDE 1 MG: 20 TABLET ORAL at 12:56

## 2018-06-28 RX ADMIN — DIVALPROEX SODIUM 1 MG: 500 TABLET, DELAYED RELEASE ORAL at 22:41

## 2018-06-28 RX ADMIN — INSULIN LISPRO 4 UNITS: 100 INJECTION, SOLUTION INTRAVENOUS; SUBCUTANEOUS at 09:13

## 2018-06-28 RX ADMIN — SODIUM CHLORIDE 1 MLS/HR: 9 INJECTION, SOLUTION INTRAVENOUS at 06:42

## 2018-06-28 RX ADMIN — PREGABALIN 1 MG: 50 CAPSULE ORAL at 05:46

## 2018-06-28 RX ADMIN — INSULIN LISPRO 4 UNITS: 100 INJECTION, SOLUTION INTRAVENOUS; SUBCUTANEOUS at 17:01

## 2018-06-28 RX ADMIN — DIVALPROEX SODIUM 1 MG: 500 TABLET, DELAYED RELEASE ORAL at 09:12

## 2018-06-28 RX ADMIN — FLUTICASONE PROPIONATE AND SALMETEROL XINAFOATE 1 PUFF: 115; 21 AEROSOL, METERED RESPIRATORY (INHALATION) at 16:00

## 2018-06-28 RX ADMIN — INSULIN LISPRO 1 UNITS: 100 INJECTION, SOLUTION INTRAVENOUS; SUBCUTANEOUS at 22:29

## 2018-06-28 RX ADMIN — Medication 1 DOSE: at 09:13

## 2018-06-28 RX ADMIN — DEXTROSE MONOHYDRATE 1 MLS/HR: 5 INJECTION INTRAVENOUS at 03:36

## 2018-06-28 RX ADMIN — MORPHINE SULFATE 1 MG: 15 TABLET, EXTENDED RELEASE ORAL at 23:52

## 2018-06-28 RX ADMIN — MORPHINE SULFATE 1 MG: 15 TABLET, EXTENDED RELEASE ORAL at 11:35

## 2018-06-28 RX ADMIN — ASPIRIN 81 MG CHEWABLE TABLET 1 MG: 81 TABLET CHEWABLE at 09:11

## 2018-06-28 RX ADMIN — Medication 1 MG: at 22:42

## 2018-06-28 RX ADMIN — DEXTROSE MONOHYDRATE 1 MLS/HR: 50 INJECTION, SOLUTION INTRAVENOUS at 01:15

## 2018-06-28 RX ADMIN — BACLOFEN 1 MG: 10 TABLET ORAL at 09:12

## 2018-06-28 RX ADMIN — Medication 1 DOSE: at 01:15

## 2018-06-28 RX ADMIN — DEXTROSE MONOHYDRATE 1 MLS/HR: 5 INJECTION INTRAVENOUS at 09:11

## 2018-06-28 RX ADMIN — INSULIN DETEMIR 1 UNITS: 100 INJECTION, SOLUTION SUBCUTANEOUS at 17:00

## 2018-06-28 RX ADMIN — BACLOFEN 1 MG: 10 TABLET ORAL at 22:41

## 2018-06-28 RX ADMIN — Medication 1 DOSE: at 05:47

## 2018-06-29 LAB
ANION GAP: 7 MEQ/L (ref 8–16)
BASO #: 0 10^3/UL (ref 0–0.2)
BASO %: 0.1 % (ref 0–1)
BLOOD UREA NITROGEN: 23 MG/DL (ref 7–18)
CALCIUM LEVEL: 7.9 MG/DL (ref 8.8–10.2)
CARBON DIOXIDE LEVEL: 29 MEQ/L (ref 21–32)
CHLORIDE LEVEL: 101 MEQ/L (ref 98–107)
CREATININE FOR GFR: 0.75 MG/DL (ref 0.55–1.3)
CRP SERPL-MCNC: 18.2 MG/DL (ref 0–0.3)
EOS #: 0.6 10^3/UL (ref 0–0.5)
EOSINOPHIL NFR BLD AUTO: 4.2 % (ref 0–3)
ERYTHROCYTE SEDIMENTATION RATE: 65 MM/HR (ref 0–30)
GFR SERPL CREATININE-BSD FRML MDRD: > 60 ML/MIN/{1.73_M2} (ref 45–?)
GLUCOSE BLDC GLUCOMTR-MCNC: 152 MG/DL (ref 80–115)
GLUCOSE BLDC GLUCOMTR-MCNC: 209 MG/DL (ref 80–115)
GLUCOSE BLDC GLUCOMTR-MCNC: 214 MG/DL (ref 80–115)
GLUCOSE, FASTING: 134 MG/DL (ref 70–100)
HEMATOCRIT: 36.2 % (ref 36–47)
HEMOGLOBIN: 11.5 G/DL (ref 12–15.5)
IMMATURE GRANULOCYTE %: 0.4 % (ref 0–3)
LYMPH #: 1.3 10^3/UL (ref 1.5–4.5)
LYMPH %: 10 % (ref 24–44)
MEAN CORPUSCULAR HEMOGLOBIN: 27.5 PG (ref 27–33)
MEAN CORPUSCULAR HGB CONC: 31.8 G/DL (ref 32–36.5)
MEAN CORPUSCULAR VOLUME: 86.6 FL (ref 80–96)
MONO #: 0.9 10^3/UL (ref 0–0.8)
MONO %: 6.5 % (ref 0–5)
NEUTROPHILS #: 10.6 10^3/UL (ref 1.8–7.7)
NEUTROPHILS %: 78.8 % (ref 36–66)
NRBC BLD AUTO-RTO: 0 % (ref 0–0)
PLATELET COUNT, AUTOMATED: 170 10^3/UL (ref 150–450)
POTASSIUM SERUM: 3.4 MEQ/L (ref 3.5–5.1)
RED BLOOD COUNT: 4.18 10^6/UL (ref 4–5.4)
RED CELL DISTRIBUTION WIDTH: 15.8 % (ref 11.5–14.5)
SODIUM LEVEL: 137 MEQ/L (ref 136–145)
WHITE BLOOD COUNT: 13.5 10^3/UL (ref 4–10)

## 2018-06-29 RX ADMIN — DOCUSATE SODIUM,SENNOSIDES 1 TAB: 50; 8.6 TABLET, FILM COATED ORAL at 22:42

## 2018-06-29 RX ADMIN — Medication 1 DOSE: at 13:00

## 2018-06-29 RX ADMIN — ENOXAPARIN SODIUM 1 MG: 40 INJECTION SUBCUTANEOUS at 09:00

## 2018-06-29 RX ADMIN — MORPHINE SULFATE 1 MG: 15 TABLET, EXTENDED RELEASE ORAL at 12:51

## 2018-06-29 RX ADMIN — Medication 1 DOSE: at 22:43

## 2018-06-29 RX ADMIN — DULOXETINE HYDROCHLORIDE 1 MG: 30 CAPSULE, DELAYED RELEASE ORAL at 10:16

## 2018-06-29 RX ADMIN — Medication 1 MG: at 10:17

## 2018-06-29 RX ADMIN — DEXTROSE MONOHYDRATE 1 MLS/HR: 50 INJECTION, SOLUTION INTRAVENOUS at 01:29

## 2018-06-29 RX ADMIN — PREGABALIN 1 MG: 50 CAPSULE ORAL at 05:45

## 2018-06-29 RX ADMIN — Medication 1 DOSE: at 01:30

## 2018-06-29 RX ADMIN — DEXTROSE MONOHYDRATE 1 MLS/HR: 5 INJECTION INTRAVENOUS at 05:45

## 2018-06-29 RX ADMIN — INSULIN LISPRO 1 UNITS: 100 INJECTION, SOLUTION INTRAVENOUS; SUBCUTANEOUS at 17:30

## 2018-06-29 RX ADMIN — PREGABALIN 1 MG: 50 CAPSULE ORAL at 15:31

## 2018-06-29 RX ADMIN — BACLOFEN 1 MG: 10 TABLET ORAL at 10:17

## 2018-06-29 RX ADMIN — DIVALPROEX SODIUM 1 MG: 500 TABLET, DELAYED RELEASE ORAL at 10:17

## 2018-06-29 RX ADMIN — DEXTROSE MONOHYDRATE 1 MLS/HR: 5 INJECTION INTRAVENOUS at 22:42

## 2018-06-29 RX ADMIN — INSULIN LISPRO 1 UNITS: 100 INJECTION, SOLUTION INTRAVENOUS; SUBCUTANEOUS at 21:00

## 2018-06-29 RX ADMIN — BACLOFEN 1 MG: 10 TABLET ORAL at 22:42

## 2018-06-29 RX ADMIN — FAMOTIDINE 1 MG: 20 TABLET, FILM COATED ORAL at 10:16

## 2018-06-29 RX ADMIN — INSULIN DETEMIR 1 UNITS: 100 INJECTION, SOLUTION SUBCUTANEOUS at 07:30

## 2018-06-29 RX ADMIN — Medication 1 DOSE: at 05:45

## 2018-06-29 RX ADMIN — MORPHINE SULFATE 1 MG: 4 INJECTION INTRAVENOUS at 17:56

## 2018-06-29 RX ADMIN — DIVALPROEX SODIUM 1 MG: 500 TABLET, DELAYED RELEASE ORAL at 22:42

## 2018-06-29 RX ADMIN — DOCUSATE SODIUM,SENNOSIDES 1 TAB: 50; 8.6 TABLET, FILM COATED ORAL at 10:17

## 2018-06-29 RX ADMIN — Medication 1 DOSE: at 10:18

## 2018-06-29 RX ADMIN — INSULIN LISPRO 1 UNITS: 100 INJECTION, SOLUTION INTRAVENOUS; SUBCUTANEOUS at 12:00

## 2018-06-29 RX ADMIN — FLUTICASONE PROPIONATE AND SALMETEROL XINAFOATE 2 PUFF: 115; 21 AEROSOL, METERED RESPIRATORY (INHALATION) at 07:57

## 2018-06-29 RX ADMIN — DEXTROSE MONOHYDRATE 1 MLS/HR: 50 INJECTION, SOLUTION INTRAVENOUS at 12:49

## 2018-06-29 RX ADMIN — DEXTROSE MONOHYDRATE 1 MLS/HR: 5 INJECTION INTRAVENOUS at 15:31

## 2018-06-29 RX ADMIN — Medication 1 DOSE: at 17:00

## 2018-06-29 RX ADMIN — ASPIRIN 81 MG CHEWABLE TABLET 1 MG: 81 TABLET CHEWABLE at 09:00

## 2018-06-29 RX ADMIN — DEXTROSE MONOHYDRATE 1 MLS/HR: 5 INJECTION INTRAVENOUS at 10:19

## 2018-06-29 RX ADMIN — CLOPIDOGREL BISULFATE 1 MG: 75 TABLET ORAL at 09:00

## 2018-06-29 RX ADMIN — INSULIN DETEMIR 1 UNITS: 100 INJECTION, SOLUTION SUBCUTANEOUS at 17:00

## 2018-06-29 RX ADMIN — FUROSEMIDE 1 MG: 20 TABLET ORAL at 14:28

## 2018-06-29 RX ADMIN — INSULIN LISPRO 1 UNITS: 100 INJECTION, SOLUTION INTRAVENOUS; SUBCUTANEOUS at 07:30

## 2018-06-29 RX ADMIN — Medication 1 MG: at 22:42

## 2018-06-29 RX ADMIN — FLUTICASONE PROPIONATE AND SALMETEROL XINAFOATE 1 PUFF: 115; 21 AEROSOL, METERED RESPIRATORY (INHALATION) at 16:00

## 2018-06-30 LAB
ANION GAP: 8 MEQ/L (ref 8–16)
BASO #: 0 10^3/UL (ref 0–0.2)
BASO %: 0.3 % (ref 0–1)
BLOOD UREA NITROGEN: 16 MG/DL (ref 7–18)
CALCIUM LEVEL: 7.9 MG/DL (ref 8.8–10.2)
CARBON DIOXIDE LEVEL: 30 MEQ/L (ref 21–32)
CHLORIDE LEVEL: 101 MEQ/L (ref 98–107)
CREATININE FOR GFR: 0.63 MG/DL (ref 0.55–1.3)
EOS #: 0.4 10^3/UL (ref 0–0.5)
EOSINOPHIL NFR BLD AUTO: 4.2 % (ref 0–3)
GFR SERPL CREATININE-BSD FRML MDRD: > 60 ML/MIN/{1.73_M2} (ref 45–?)
GLUCOSE BLDC GLUCOMTR-MCNC: 212 MG/DL (ref 80–115)
GLUCOSE BLDC GLUCOMTR-MCNC: 243 MG/DL (ref 80–115)
GLUCOSE BLDC GLUCOMTR-MCNC: 263 MG/DL (ref 80–115)
GLUCOSE, FASTING: 275 MG/DL (ref 70–100)
HEMATOCRIT: 34.1 % (ref 36–47)
HEMOGLOBIN: 11 G/DL (ref 12–15.5)
IMMATURE GRANULOCYTE %: 0.4 % (ref 0–3)
LYMPH #: 1.5 10^3/UL (ref 1.5–4.5)
LYMPH %: 16.1 % (ref 24–44)
MEAN CORPUSCULAR HEMOGLOBIN: 27.5 PG (ref 27–33)
MEAN CORPUSCULAR HGB CONC: 32.3 G/DL (ref 32–36.5)
MEAN CORPUSCULAR VOLUME: 85.3 FL (ref 80–96)
MONO #: 0.7 10^3/UL (ref 0–0.8)
MONO %: 7.7 % (ref 0–5)
NEUTROPHILS #: 6.6 10^3/UL (ref 1.8–7.7)
NEUTROPHILS %: 71.3 % (ref 36–66)
NRBC BLD AUTO-RTO: 0 % (ref 0–0)
PLATELET COUNT, AUTOMATED: 183 10^3/UL (ref 150–450)
POTASSIUM SERUM: 3.5 MEQ/L (ref 3.5–5.1)
RED BLOOD COUNT: 4 10^6/UL (ref 4–5.4)
RED CELL DISTRIBUTION WIDTH: 15.7 % (ref 11.5–14.5)
SODIUM LEVEL: 139 MEQ/L (ref 136–145)
VANCOMYCIN LEVEL TROUGH: 9.1 UG/ML (ref 10–20)
WHITE BLOOD COUNT: 9.3 10^3/UL (ref 4–10)

## 2018-06-30 RX ADMIN — DEXTROSE MONOHYDRATE 1 MLS/HR: 5 INJECTION INTRAVENOUS at 15:19

## 2018-06-30 RX ADMIN — DEXTROSE MONOHYDRATE 1 MLS/HR: 5 INJECTION INTRAVENOUS at 09:52

## 2018-06-30 RX ADMIN — Medication 1 DOSE: at 01:12

## 2018-06-30 RX ADMIN — FUROSEMIDE 1 MG: 20 TABLET ORAL at 13:31

## 2018-06-30 RX ADMIN — DULOXETINE HYDROCHLORIDE 1 MG: 30 CAPSULE, DELAYED RELEASE ORAL at 09:51

## 2018-06-30 RX ADMIN — Medication 1 DOSE: at 16:56

## 2018-06-30 RX ADMIN — DOCUSATE SODIUM,SENNOSIDES 1 TAB: 50; 8.6 TABLET, FILM COATED ORAL at 20:25

## 2018-06-30 RX ADMIN — DEXTROSE MONOHYDRATE 1 MLS/HR: 5 INJECTION INTRAVENOUS at 04:20

## 2018-06-30 RX ADMIN — PREGABALIN 1 MG: 50 CAPSULE ORAL at 04:20

## 2018-06-30 RX ADMIN — FLUTICASONE PROPIONATE AND SALMETEROL XINAFOATE 2 PUFF: 115; 21 AEROSOL, METERED RESPIRATORY (INHALATION) at 07:58

## 2018-06-30 RX ADMIN — INSULIN LISPRO 2 UNITS: 100 INJECTION, SOLUTION INTRAVENOUS; SUBCUTANEOUS at 21:58

## 2018-06-30 RX ADMIN — DIVALPROEX SODIUM 1 MG: 500 TABLET, DELAYED RELEASE ORAL at 09:51

## 2018-06-30 RX ADMIN — Medication 1 DOSE: at 04:21

## 2018-06-30 RX ADMIN — Medication 1 DOSE: at 09:52

## 2018-06-30 RX ADMIN — DEXTROSE MONOHYDRATE 1 MLS/HR: 50 INJECTION, SOLUTION INTRAVENOUS at 12:47

## 2018-06-30 RX ADMIN — INSULIN LISPRO 6 UNITS: 100 INJECTION, SOLUTION INTRAVENOUS; SUBCUTANEOUS at 09:49

## 2018-06-30 RX ADMIN — INSULIN LISPRO 6 UNITS: 100 INJECTION, SOLUTION INTRAVENOUS; SUBCUTANEOUS at 13:31

## 2018-06-30 RX ADMIN — Medication 1 MG: at 09:51

## 2018-06-30 RX ADMIN — PREGABALIN 1 MG: 50 CAPSULE ORAL at 15:19

## 2018-06-30 RX ADMIN — DEXTROSE MONOHYDRATE 1 MLS/HR: 50 INJECTION, SOLUTION INTRAVENOUS at 00:49

## 2018-06-30 RX ADMIN — ASPIRIN 81 MG CHEWABLE TABLET 1 MG: 81 TABLET CHEWABLE at 09:50

## 2018-06-30 RX ADMIN — MORPHINE SULFATE 1 MG: 4 INJECTION INTRAVENOUS at 09:50

## 2018-06-30 RX ADMIN — MORPHINE SULFATE 1 MG: 15 TABLET, EXTENDED RELEASE ORAL at 00:51

## 2018-06-30 RX ADMIN — BACLOFEN 1 MG: 10 TABLET ORAL at 20:26

## 2018-06-30 RX ADMIN — INSULIN DETEMIR 1 UNITS: 100 INJECTION, SOLUTION SUBCUTANEOUS at 16:57

## 2018-06-30 RX ADMIN — CLOPIDOGREL BISULFATE 1 MG: 75 TABLET ORAL at 09:50

## 2018-06-30 RX ADMIN — DEXTROSE MONOHYDRATE 1 MLS/HR: 50 INJECTION, SOLUTION INTRAVENOUS at 20:24

## 2018-06-30 RX ADMIN — INSULIN LISPRO 6 UNITS: 100 INJECTION, SOLUTION INTRAVENOUS; SUBCUTANEOUS at 17:33

## 2018-06-30 RX ADMIN — Medication 1 DOSE: at 12:38

## 2018-06-30 RX ADMIN — ENOXAPARIN SODIUM 1 MG: 40 INJECTION SUBCUTANEOUS at 09:49

## 2018-06-30 RX ADMIN — DEXTROSE MONOHYDRATE 1 MLS/HR: 5 INJECTION INTRAVENOUS at 21:58

## 2018-06-30 RX ADMIN — DIVALPROEX SODIUM 1 MG: 500 TABLET, DELAYED RELEASE ORAL at 20:25

## 2018-06-30 RX ADMIN — INSULIN DETEMIR 1 UNITS: 100 INJECTION, SOLUTION SUBCUTANEOUS at 07:30

## 2018-06-30 RX ADMIN — BACLOFEN 1 MG: 10 TABLET ORAL at 09:50

## 2018-06-30 RX ADMIN — MORPHINE SULFATE 1 MG: 15 TABLET, EXTENDED RELEASE ORAL at 12:10

## 2018-06-30 RX ADMIN — FAMOTIDINE 1 MG: 20 TABLET, FILM COATED ORAL at 09:51

## 2018-06-30 RX ADMIN — DOCUSATE SODIUM,SENNOSIDES 1 TAB: 50; 8.6 TABLET, FILM COATED ORAL at 09:00

## 2018-06-30 RX ADMIN — Medication 1 DOSE: at 20:26

## 2018-06-30 RX ADMIN — Medication 1 MG: at 20:26

## 2018-06-30 RX ADMIN — FLUTICASONE PROPIONATE AND SALMETEROL XINAFOATE 2 PUFF: 115; 21 AEROSOL, METERED RESPIRATORY (INHALATION) at 20:42

## 2018-07-01 LAB
ANION GAP: 9 MEQ/L (ref 8–16)
BASO #: 0 10^3/UL (ref 0–0.2)
BASO %: 0.4 % (ref 0–1)
BLOOD UREA NITROGEN: 13 MG/DL (ref 7–18)
CALCIUM LEVEL: 8.2 MG/DL (ref 8.8–10.2)
CARBON DIOXIDE LEVEL: 30 MEQ/L (ref 21–32)
CHLORIDE LEVEL: 100 MEQ/L (ref 98–107)
CREATININE FOR GFR: 0.58 MG/DL (ref 0.55–1.3)
EOS #: 0.4 10^3/UL (ref 0–0.5)
EOSINOPHIL NFR BLD AUTO: 3.9 % (ref 0–3)
GFR SERPL CREATININE-BSD FRML MDRD: > 60 ML/MIN/{1.73_M2} (ref 45–?)
GLUCOSE BLDC GLUCOMTR-MCNC: 250 MG/DL (ref 80–115)
GLUCOSE BLDC GLUCOMTR-MCNC: 259 MG/DL (ref 80–115)
GLUCOSE BLDC GLUCOMTR-MCNC: 279 MG/DL (ref 80–115)
GLUCOSE, FASTING: 258 MG/DL (ref 70–100)
HEMATOCRIT: 34.1 % (ref 36–47)
HEMOGLOBIN: 11 G/DL (ref 12–15.5)
IMMATURE GRANULOCYTE %: 0.6 % (ref 0–3)
LYMPH #: 2 10^3/UL (ref 1.5–4.5)
LYMPH %: 20.4 % (ref 24–44)
MEAN CORPUSCULAR HEMOGLOBIN: 27.5 PG (ref 27–33)
MEAN CORPUSCULAR HGB CONC: 32.3 G/DL (ref 32–36.5)
MEAN CORPUSCULAR VOLUME: 85.3 FL (ref 80–96)
MONO #: 0.9 10^3/UL (ref 0–0.8)
MONO %: 8.6 % (ref 0–5)
NEUTROPHILS #: 6.6 10^3/UL (ref 1.8–7.7)
NEUTROPHILS %: 66.1 % (ref 36–66)
NRBC BLD AUTO-RTO: 0 % (ref 0–0)
PLATELET COUNT, AUTOMATED: 195 10^3/UL (ref 150–450)
POTASSIUM SERUM: 3.8 MEQ/L (ref 3.5–5.1)
RED BLOOD COUNT: 4 10^6/UL (ref 4–5.4)
RED CELL DISTRIBUTION WIDTH: 15.7 % (ref 11.5–14.5)
SODIUM LEVEL: 139 MEQ/L (ref 136–145)
WHITE BLOOD COUNT: 9.9 10^3/UL (ref 4–10)

## 2018-07-01 PROCEDURE — 0J9H0ZX DRAINAGE OF LEFT LOWER ARM SUBCUTANEOUS TISSUE AND FASCIA, OPEN APPROACH, DIAGNOSTIC: ICD-10-PCS

## 2018-07-01 RX ADMIN — MORPHINE SULFATE 1 MG: 4 INJECTION INTRAVENOUS at 02:15

## 2018-07-01 RX ADMIN — ASPIRIN 81 MG CHEWABLE TABLET 1 MG: 81 TABLET CHEWABLE at 09:00

## 2018-07-01 RX ADMIN — MORPHINE SULFATE 1 MG: 4 INJECTION INTRAVENOUS at 13:59

## 2018-07-01 RX ADMIN — PREGABALIN 1 MG: 50 CAPSULE ORAL at 04:27

## 2018-07-01 RX ADMIN — MORPHINE SULFATE 1 MG: 15 TABLET, EXTENDED RELEASE ORAL at 12:00

## 2018-07-01 RX ADMIN — DEXTROSE MONOHYDRATE 1 MLS/HR: 5 INJECTION INTRAVENOUS at 16:51

## 2018-07-01 RX ADMIN — FUROSEMIDE 1 MG: 20 TABLET ORAL at 13:59

## 2018-07-01 RX ADMIN — DOCUSATE SODIUM,SENNOSIDES 1 TAB: 50; 8.6 TABLET, FILM COATED ORAL at 21:17

## 2018-07-01 RX ADMIN — PREGABALIN 1 MG: 50 CAPSULE ORAL at 16:00

## 2018-07-01 RX ADMIN — Medication 1 MG: at 09:00

## 2018-07-01 RX ADMIN — DEXTROSE MONOHYDRATE 1 MLS/HR: 50 INJECTION, SOLUTION INTRAVENOUS at 09:00

## 2018-07-01 RX ADMIN — INSULIN DETEMIR 1 UNITS: 100 INJECTION, SOLUTION SUBCUTANEOUS at 17:12

## 2018-07-01 RX ADMIN — SODIUM CHLORIDE 1 MLS/HR: 9 INJECTION, SOLUTION INTRAVENOUS at 18:55

## 2018-07-01 RX ADMIN — INSULIN LISPRO 1 UNITS: 100 INJECTION, SOLUTION INTRAVENOUS; SUBCUTANEOUS at 17:30

## 2018-07-01 RX ADMIN — BACLOFEN 1 MG: 10 TABLET ORAL at 09:00

## 2018-07-01 RX ADMIN — INSULIN LISPRO 2 UNITS: 100 INJECTION, SOLUTION INTRAVENOUS; SUBCUTANEOUS at 21:30

## 2018-07-01 RX ADMIN — DIVALPROEX SODIUM 1 MG: 500 TABLET, DELAYED RELEASE ORAL at 21:18

## 2018-07-01 RX ADMIN — INSULIN DETEMIR 1 UNITS: 100 INJECTION, SOLUTION SUBCUTANEOUS at 17:00

## 2018-07-01 RX ADMIN — DEXTROSE MONOHYDRATE 1 MLS/HR: 5 INJECTION INTRAVENOUS at 04:27

## 2018-07-01 RX ADMIN — INSULIN DETEMIR 1 UNITS: 100 INJECTION, SOLUTION SUBCUTANEOUS at 10:02

## 2018-07-01 RX ADMIN — DEXTROSE MONOHYDRATE 1 MLS/HR: 50 INJECTION, SOLUTION INTRAVENOUS at 21:17

## 2018-07-01 RX ADMIN — LIDOCAINE HYDROCHLORIDE 1 ML: 10 INJECTION, SOLUTION EPIDURAL; INFILTRATION; INTRACAUDAL; PERINEURAL at 18:10

## 2018-07-01 RX ADMIN — INSULIN LISPRO 1 UNITS: 100 INJECTION, SOLUTION INTRAVENOUS; SUBCUTANEOUS at 12:00

## 2018-07-01 RX ADMIN — ENOXAPARIN SODIUM 1 MG: 40 INJECTION SUBCUTANEOUS at 09:00

## 2018-07-01 RX ADMIN — Medication 1 DOSE: at 09:00

## 2018-07-01 RX ADMIN — MORPHINE SULFATE 1 MG: 15 TABLET, EXTENDED RELEASE ORAL at 23:03

## 2018-07-01 RX ADMIN — CLOPIDOGREL BISULFATE 1 MG: 75 TABLET ORAL at 09:00

## 2018-07-01 RX ADMIN — FAMOTIDINE 1 MG: 20 TABLET, FILM COATED ORAL at 09:00

## 2018-07-01 RX ADMIN — Medication 1 DOSE: at 04:57

## 2018-07-01 RX ADMIN — SODIUM CHLORIDE, POTASSIUM CHLORIDE, SODIUM LACTATE AND CALCIUM CHLORIDE 1 MLS/HR: 600; 310; 30; 20 INJECTION, SOLUTION INTRAVENOUS at 18:45

## 2018-07-01 RX ADMIN — FLUTICASONE PROPIONATE AND SALMETEROL XINAFOATE 2 PUFF: 115; 21 AEROSOL, METERED RESPIRATORY (INHALATION) at 20:39

## 2018-07-01 RX ADMIN — INSULIN LISPRO 1 UNITS: 100 INJECTION, SOLUTION INTRAVENOUS; SUBCUTANEOUS at 07:30

## 2018-07-01 RX ADMIN — Medication 1 MG: at 21:17

## 2018-07-01 RX ADMIN — DOCUSATE SODIUM,SENNOSIDES 1 TAB: 50; 8.6 TABLET, FILM COATED ORAL at 09:00

## 2018-07-01 RX ADMIN — Medication 1 DOSE: at 00:32

## 2018-07-01 RX ADMIN — DEXTROSE MONOHYDRATE 1 MLS/HR: 5 INJECTION INTRAVENOUS at 10:00

## 2018-07-01 RX ADMIN — DIVALPROEX SODIUM 1 MG: 500 TABLET, DELAYED RELEASE ORAL at 09:00

## 2018-07-01 RX ADMIN — Medication 1 DOSE: at 17:00

## 2018-07-01 RX ADMIN — BACLOFEN 1 MG: 10 TABLET ORAL at 21:17

## 2018-07-01 RX ADMIN — SODIUM CHLORIDE, POTASSIUM CHLORIDE, SODIUM LACTATE AND CALCIUM CHLORIDE 1 MLS/HR: 600; 310; 30; 20 INJECTION, SOLUTION INTRAVENOUS at 12:15

## 2018-07-01 RX ADMIN — DULOXETINE HYDROCHLORIDE 1 MG: 30 CAPSULE, DELAYED RELEASE ORAL at 09:00

## 2018-07-01 RX ADMIN — Medication 1 DOSE: at 21:00

## 2018-07-01 RX ADMIN — DEXTROSE MONOHYDRATE 1 MLS/HR: 5 INJECTION INTRAVENOUS at 23:03

## 2018-07-01 RX ADMIN — INSULIN DETEMIR 1 UNITS: 100 INJECTION, SOLUTION SUBCUTANEOUS at 07:30

## 2018-07-01 RX ADMIN — MORPHINE SULFATE 1 MG: 15 TABLET, EXTENDED RELEASE ORAL at 00:26

## 2018-07-01 RX ADMIN — Medication 1 DOSE: at 13:00

## 2018-07-01 RX ADMIN — FLUTICASONE PROPIONATE AND SALMETEROL XINAFOATE 1 PUFF: 115; 21 AEROSOL, METERED RESPIRATORY (INHALATION) at 07:45

## 2018-07-02 LAB
ANION GAP: 8 MEQ/L (ref 8–16)
BASO #: 0 10^3/UL (ref 0–0.2)
BASO %: 0.5 % (ref 0–1)
BLOOD UREA NITROGEN: 15 MG/DL (ref 7–18)
CALCIUM LEVEL: 7.4 MG/DL (ref 8.8–10.2)
CARBON DIOXIDE LEVEL: 30 MEQ/L (ref 21–32)
CHLORIDE LEVEL: 101 MEQ/L (ref 98–107)
CREATININE FOR GFR: 0.67 MG/DL (ref 0.55–1.3)
CRP SERPL-MCNC: 10.9 MG/DL (ref 0–0.3)
EOS #: 0.3 10^3/UL (ref 0–0.5)
EOSINOPHIL NFR BLD AUTO: 3.7 % (ref 0–3)
GFR SERPL CREATININE-BSD FRML MDRD: > 60 ML/MIN/{1.73_M2} (ref 45–?)
GLUCOSE BLDC GLUCOMTR-MCNC: 172 MG/DL (ref 80–115)
GLUCOSE BLDC GLUCOMTR-MCNC: 223 MG/DL (ref 80–115)
GLUCOSE BLDC GLUCOMTR-MCNC: 236 MG/DL (ref 80–115)
GLUCOSE, FASTING: 318 MG/DL (ref 70–100)
HEMATOCRIT: 32.8 % (ref 36–47)
HEMOGLOBIN: 10.5 G/DL (ref 12–15.5)
IMMATURE GRANULOCYTE %: 0.8 % (ref 0–3)
LYMPH #: 2.4 10^3/UL (ref 1.5–4.5)
LYMPH %: 28.1 % (ref 24–44)
MEAN CORPUSCULAR HEMOGLOBIN: 27.4 PG (ref 27–33)
MEAN CORPUSCULAR HGB CONC: 32 G/DL (ref 32–36.5)
MEAN CORPUSCULAR VOLUME: 85.6 FL (ref 80–96)
MONO #: 0.7 10^3/UL (ref 0–0.8)
MONO %: 8 % (ref 0–5)
NEUTROPHILS #: 5.1 10^3/UL (ref 1.8–7.7)
NEUTROPHILS %: 58.9 % (ref 36–66)
NRBC BLD AUTO-RTO: 0 % (ref 0–0)
PLATELET COUNT, AUTOMATED: 189 10^3/UL (ref 150–450)
POTASSIUM SERUM: 3.9 MEQ/L (ref 3.5–5.1)
RED BLOOD COUNT: 3.83 10^6/UL (ref 4–5.4)
RED CELL DISTRIBUTION WIDTH: 15.8 % (ref 11.5–14.5)
SODIUM LEVEL: 139 MEQ/L (ref 136–145)
VANCOMYCIN LEVEL TROUGH: 7.2 UG/ML (ref 10–20)
WHITE BLOOD COUNT: 8.6 10^3/UL (ref 4–10)

## 2018-07-02 RX ADMIN — DEXTROSE MONOHYDRATE 1 MLS/HR: 5 INJECTION INTRAVENOUS at 11:31

## 2018-07-02 RX ADMIN — FAMOTIDINE 1 MG: 20 TABLET, FILM COATED ORAL at 10:55

## 2018-07-02 RX ADMIN — DOCUSATE SODIUM,SENNOSIDES 1 TAB: 50; 8.6 TABLET, FILM COATED ORAL at 21:24

## 2018-07-02 RX ADMIN — FUROSEMIDE 1 MG: 20 TABLET ORAL at 13:36

## 2018-07-02 RX ADMIN — INSULIN LISPRO 1 UNITS: 100 INJECTION, SOLUTION INTRAVENOUS; SUBCUTANEOUS at 21:00

## 2018-07-02 RX ADMIN — NYSTATIN 1 DOSE: 100000 POWDER TOPICAL at 21:25

## 2018-07-02 RX ADMIN — MORPHINE SULFATE 1 MG: 15 TABLET, EXTENDED RELEASE ORAL at 21:24

## 2018-07-02 RX ADMIN — DIVALPROEX SODIUM 1 MG: 500 TABLET, DELAYED RELEASE ORAL at 21:24

## 2018-07-02 RX ADMIN — INSULIN LISPRO 6 UNITS: 100 INJECTION, SOLUTION INTRAVENOUS; SUBCUTANEOUS at 12:22

## 2018-07-02 RX ADMIN — DEXTROSE MONOHYDRATE 1 MLS/HR: 50 INJECTION, SOLUTION INTRAVENOUS at 14:47

## 2018-07-02 RX ADMIN — ASPIRIN 81 MG CHEWABLE TABLET 1 MG: 81 TABLET CHEWABLE at 10:50

## 2018-07-02 RX ADMIN — INSULIN DETEMIR 1 UNITS: 100 INJECTION, SOLUTION SUBCUTANEOUS at 21:23

## 2018-07-02 RX ADMIN — FLUTICASONE PROPIONATE AND SALMETEROL XINAFOATE 1 PUFF: 115; 21 AEROSOL, METERED RESPIRATORY (INHALATION) at 09:13

## 2018-07-02 RX ADMIN — DEXTROSE MONOHYDRATE 1 MLS/HR: 5 INJECTION INTRAVENOUS at 04:23

## 2018-07-02 RX ADMIN — NYSTATIN 1 DOSE: 100000 POWDER TOPICAL at 10:16

## 2018-07-02 RX ADMIN — ENOXAPARIN SODIUM 1 MG: 40 INJECTION SUBCUTANEOUS at 10:52

## 2018-07-02 RX ADMIN — PREGABALIN 1 MG: 50 CAPSULE ORAL at 15:56

## 2018-07-02 RX ADMIN — DULOXETINE HYDROCHLORIDE 1 MG: 30 CAPSULE, DELAYED RELEASE ORAL at 10:53

## 2018-07-02 RX ADMIN — DEXTROSE MONOHYDRATE 1 MLS/HR: 50 INJECTION, SOLUTION INTRAVENOUS at 09:26

## 2018-07-02 RX ADMIN — Medication 1 MG: at 21:24

## 2018-07-02 RX ADMIN — Medication 1 MG: at 10:55

## 2018-07-02 RX ADMIN — ACETAMINOPHEN 1 MG: 325 TABLET ORAL at 20:10

## 2018-07-02 RX ADMIN — INSULIN LISPRO 10 UNITS: 100 INJECTION, SOLUTION INTRAVENOUS; SUBCUTANEOUS at 07:54

## 2018-07-02 RX ADMIN — FLUTICASONE PROPIONATE AND SALMETEROL XINAFOATE 1 PUFF: 115; 21 AEROSOL, METERED RESPIRATORY (INHALATION) at 21:25

## 2018-07-02 RX ADMIN — CLOPIDOGREL BISULFATE 1 MG: 75 TABLET ORAL at 10:56

## 2018-07-02 RX ADMIN — ACETAMINOPHEN 1 MG: 325 TABLET ORAL at 13:35

## 2018-07-02 RX ADMIN — DEXTROSE MONOHYDRATE 1 MLS/HR: 5 INJECTION INTRAVENOUS at 15:56

## 2018-07-02 RX ADMIN — DEXTROSE MONOHYDRATE 1 MLS/HR: 5 INJECTION INTRAVENOUS at 21:25

## 2018-07-02 RX ADMIN — PREGABALIN 1 MG: 50 CAPSULE ORAL at 04:23

## 2018-07-02 RX ADMIN — MORPHINE SULFATE 1 MG: 4 INJECTION INTRAVENOUS at 05:26

## 2018-07-02 RX ADMIN — Medication 1 DOSE: at 00:20

## 2018-07-02 RX ADMIN — Medication 1 DOSE: at 04:23

## 2018-07-02 RX ADMIN — DOCUSATE SODIUM,SENNOSIDES 1 TAB: 50; 8.6 TABLET, FILM COATED ORAL at 10:56

## 2018-07-02 RX ADMIN — INSULIN LISPRO 6 UNITS: 100 INJECTION, SOLUTION INTRAVENOUS; SUBCUTANEOUS at 17:02

## 2018-07-02 RX ADMIN — BACLOFEN 1 MG: 10 TABLET ORAL at 21:24

## 2018-07-02 RX ADMIN — MORPHINE SULFATE 1 MG: 4 INJECTION INTRAVENOUS at 20:19

## 2018-07-02 RX ADMIN — INSULIN DETEMIR 1 UNITS: 100 INJECTION, SOLUTION SUBCUTANEOUS at 07:53

## 2018-07-02 RX ADMIN — DIVALPROEX SODIUM 1 MG: 500 TABLET, DELAYED RELEASE ORAL at 10:54

## 2018-07-02 RX ADMIN — MORPHINE SULFATE 1 MG: 15 TABLET, EXTENDED RELEASE ORAL at 11:48

## 2018-07-02 RX ADMIN — BACLOFEN 1 MG: 10 TABLET ORAL at 10:56

## 2018-07-03 LAB
ANION GAP: 7 MEQ/L (ref 8–16)
BASO #: 0.1 10^3/UL (ref 0–0.2)
BASO %: 0.6 % (ref 0–1)
BLOOD UREA NITROGEN: 13 MG/DL (ref 7–18)
CALCIUM LEVEL: 8.1 MG/DL (ref 8.8–10.2)
CARBON DIOXIDE LEVEL: 31 MEQ/L (ref 21–32)
CHLORIDE LEVEL: 101 MEQ/L (ref 98–107)
CREATININE FOR GFR: 0.58 MG/DL (ref 0.55–1.3)
EOS #: 0.5 10^3/UL (ref 0–0.5)
EOSINOPHIL NFR BLD AUTO: 5.1 % (ref 0–3)
GFR SERPL CREATININE-BSD FRML MDRD: > 60 ML/MIN/{1.73_M2} (ref 45–?)
GLUCOSE BLDC GLUCOMTR-MCNC: 100 MG/DL (ref 80–115)
GLUCOSE BLDC GLUCOMTR-MCNC: 151 MG/DL (ref 80–115)
GLUCOSE BLDC GLUCOMTR-MCNC: 188 MG/DL (ref 80–115)
GLUCOSE, FASTING: 238 MG/DL (ref 70–100)
HEMATOCRIT: 33.2 % (ref 36–47)
HEMOGLOBIN: 10.5 G/DL (ref 12–15.5)
IMMATURE GRANULOCYTE %: 1.9 % (ref 0–3)
LYMPH #: 3.1 10^3/UL (ref 1.5–4.5)
LYMPH %: 33.3 % (ref 24–44)
MEAN CORPUSCULAR HEMOGLOBIN: 27.2 PG (ref 27–33)
MEAN CORPUSCULAR HGB CONC: 31.6 G/DL (ref 32–36.5)
MEAN CORPUSCULAR VOLUME: 86 FL (ref 80–96)
MONO #: 0.7 10^3/UL (ref 0–0.8)
MONO %: 7.3 % (ref 0–5)
NEUTROPHILS #: 4.9 10^3/UL (ref 1.8–7.7)
NEUTROPHILS %: 51.8 % (ref 36–66)
NRBC BLD AUTO-RTO: 0.2 % (ref 0–0)
PLATELET COUNT, AUTOMATED: 227 10^3/UL (ref 150–450)
POTASSIUM SERUM: 3.9 MEQ/L (ref 3.5–5.1)
RED BLOOD COUNT: 3.86 10^6/UL (ref 4–5.4)
RED CELL DISTRIBUTION WIDTH: 15.9 % (ref 11.5–14.5)
SODIUM LEVEL: 139 MEQ/L (ref 136–145)
VANCOMYCIN LEVEL TROUGH: 10.1 UG/ML (ref 10–20)
WHITE BLOOD COUNT: 9.4 10^3/UL (ref 4–10)

## 2018-07-03 RX ADMIN — ACETAMINOPHEN 1 MG: 325 TABLET ORAL at 13:06

## 2018-07-03 RX ADMIN — BACLOFEN 1 MG: 10 TABLET ORAL at 21:02

## 2018-07-03 RX ADMIN — FUROSEMIDE 1 MG: 20 TABLET ORAL at 13:14

## 2018-07-03 RX ADMIN — Medication 1 MG: at 21:02

## 2018-07-03 RX ADMIN — INSULIN DETEMIR 1 UNITS: 100 INJECTION, SOLUTION SUBCUTANEOUS at 08:46

## 2018-07-03 RX ADMIN — DOCUSATE SODIUM,SENNOSIDES 1 TAB: 50; 8.6 TABLET, FILM COATED ORAL at 21:02

## 2018-07-03 RX ADMIN — INSULIN LISPRO 4 UNITS: 100 INJECTION, SOLUTION INTRAVENOUS; SUBCUTANEOUS at 12:15

## 2018-07-03 RX ADMIN — Medication 1 MG: at 08:41

## 2018-07-03 RX ADMIN — CLOPIDOGREL BISULFATE 1 MG: 75 TABLET ORAL at 08:42

## 2018-07-03 RX ADMIN — DEXTROSE MONOHYDRATE 1 MLS/HR: 50 INJECTION, SOLUTION INTRAVENOUS at 04:02

## 2018-07-03 RX ADMIN — PREGABALIN 1 MG: 50 CAPSULE ORAL at 05:07

## 2018-07-03 RX ADMIN — DEXTROSE MONOHYDRATE 1 MLS/HR: 5 INJECTION INTRAVENOUS at 21:02

## 2018-07-03 RX ADMIN — DOCUSATE SODIUM,SENNOSIDES 1 TAB: 50; 8.6 TABLET, FILM COATED ORAL at 08:43

## 2018-07-03 RX ADMIN — INSULIN LISPRO 1 UNITS: 100 INJECTION, SOLUTION INTRAVENOUS; SUBCUTANEOUS at 20:31

## 2018-07-03 RX ADMIN — MORPHINE SULFATE 1 MG: 4 INJECTION INTRAVENOUS at 04:02

## 2018-07-03 RX ADMIN — FLUTICASONE PROPIONATE AND SALMETEROL XINAFOATE 1 PUFF: 115; 21 AEROSOL, METERED RESPIRATORY (INHALATION) at 12:04

## 2018-07-03 RX ADMIN — MORPHINE SULFATE 1 MG: 15 TABLET, EXTENDED RELEASE ORAL at 21:02

## 2018-07-03 RX ADMIN — DIVALPROEX SODIUM 1 MG: 500 TABLET, DELAYED RELEASE ORAL at 08:43

## 2018-07-03 RX ADMIN — DEXTROSE MONOHYDRATE 1 MLS/HR: 50 INJECTION, SOLUTION INTRAVENOUS at 14:56

## 2018-07-03 RX ADMIN — FAMOTIDINE 1 MG: 20 TABLET, FILM COATED ORAL at 08:41

## 2018-07-03 RX ADMIN — ENOXAPARIN SODIUM 1 MG: 40 INJECTION SUBCUTANEOUS at 08:43

## 2018-07-03 RX ADMIN — DULOXETINE HYDROCHLORIDE 1 MG: 30 CAPSULE, DELAYED RELEASE ORAL at 08:41

## 2018-07-03 RX ADMIN — NYSTATIN 1 DOSE: 100000 POWDER TOPICAL at 08:46

## 2018-07-03 RX ADMIN — DEXTROSE MONOHYDRATE 1 MLS/HR: 5 INJECTION INTRAVENOUS at 10:20

## 2018-07-03 RX ADMIN — MORPHINE SULFATE 1 MG: 15 TABLET, EXTENDED RELEASE ORAL at 08:42

## 2018-07-03 RX ADMIN — DIVALPROEX SODIUM 1 MG: 500 TABLET, DELAYED RELEASE ORAL at 21:03

## 2018-07-03 RX ADMIN — DEXTROSE MONOHYDRATE 1 MLS/HR: 5 INJECTION INTRAVENOUS at 05:07

## 2018-07-03 RX ADMIN — INSULIN LISPRO 4 UNITS: 100 INJECTION, SOLUTION INTRAVENOUS; SUBCUTANEOUS at 17:26

## 2018-07-03 RX ADMIN — PREGABALIN 1 MG: 50 CAPSULE ORAL at 16:05

## 2018-07-03 RX ADMIN — FLUTICASONE PROPIONATE AND SALMETEROL XINAFOATE 1 PUFF: 115; 21 AEROSOL, METERED RESPIRATORY (INHALATION) at 22:46

## 2018-07-03 RX ADMIN — DEXTROSE MONOHYDRATE 1 MLS/HR: 5 INJECTION INTRAVENOUS at 16:05

## 2018-07-03 RX ADMIN — ASPIRIN 81 MG CHEWABLE TABLET 1 MG: 81 TABLET CHEWABLE at 08:41

## 2018-07-03 RX ADMIN — BACLOFEN 1 MG: 10 TABLET ORAL at 08:43

## 2018-07-03 RX ADMIN — NYSTATIN 1 DOSE: 100000 POWDER TOPICAL at 21:03

## 2018-07-03 RX ADMIN — INSULIN LISPRO 6 UNITS: 100 INJECTION, SOLUTION INTRAVENOUS; SUBCUTANEOUS at 08:44

## 2018-07-03 RX ADMIN — INSULIN DETEMIR 1 UNITS: 100 INJECTION, SOLUTION SUBCUTANEOUS at 21:01

## 2018-07-04 LAB
ALBUMIN/GLOBULIN RATIO: 0.54 (ref 1–1.93)
ALBUMIN: 2.2 GM/DL (ref 3.2–5.2)
ALKALINE PHOSPHATASE: 119 U/L (ref 45–117)
ALT SERPL W P-5'-P-CCNC: 23 U/L (ref 12–78)
ANION GAP: 4 MEQ/L (ref 8–16)
AST SERPL-CCNC: 16 U/L (ref 7–37)
BASO #: 0.1 10^3/UL (ref 0–0.2)
BASO %: 0.8 % (ref 0–1)
BILIRUBIN,TOTAL: 0.3 MG/DL (ref 0.2–1)
BLOOD UREA NITROGEN: 12 MG/DL (ref 7–18)
CALCIUM LEVEL: 7.8 MG/DL (ref 8.8–10.2)
CARBON DIOXIDE LEVEL: 33 MEQ/L (ref 21–32)
CHLORIDE LEVEL: 104 MEQ/L (ref 98–107)
CREATININE FOR GFR: 0.48 MG/DL (ref 0.55–1.3)
CRP SERPL-MCNC: 3.97 MG/DL (ref 0–0.3)
EOS #: 0.4 10^3/UL (ref 0–0.5)
EOSINOPHIL NFR BLD AUTO: 4.6 % (ref 0–3)
GFR SERPL CREATININE-BSD FRML MDRD: > 60 ML/MIN/{1.73_M2} (ref 45–?)
GLUCOSE BLDC GLUCOMTR-MCNC: 173 MG/DL (ref 80–115)
GLUCOSE BLDC GLUCOMTR-MCNC: 182 MG/DL (ref 80–115)
GLUCOSE BLDC GLUCOMTR-MCNC: 193 MG/DL (ref 80–115)
GLUCOSE, FASTING: 117 MG/DL (ref 70–100)
HEMATOCRIT: 34.5 % (ref 36–47)
HEMOGLOBIN: 11 G/DL (ref 12–15.5)
IMMATURE GRANULOCYTE %: 3.7 % (ref 0–3)
LYMPH #: 2.9 10^3/UL (ref 1.5–4.5)
LYMPH %: 29.7 % (ref 24–44)
MEAN CORPUSCULAR HEMOGLOBIN: 27.2 PG (ref 27–33)
MEAN CORPUSCULAR HGB CONC: 31.9 G/DL (ref 32–36.5)
MEAN CORPUSCULAR VOLUME: 85.4 FL (ref 80–96)
MONO #: 0.7 10^3/UL (ref 0–0.8)
MONO %: 7.5 % (ref 0–5)
NEUTROPHILS #: 5.2 10^3/UL (ref 1.8–7.7)
NEUTROPHILS %: 53.7 % (ref 36–66)
NRBC BLD AUTO-RTO: 0.2 % (ref 0–0)
PLATELET COUNT, AUTOMATED: 265 10^3/UL (ref 150–450)
POTASSIUM SERUM: 3.9 MEQ/L (ref 3.5–5.1)
RED BLOOD COUNT: 4.04 10^6/UL (ref 4–5.4)
RED CELL DISTRIBUTION WIDTH: 15.8 % (ref 11.5–14.5)
SODIUM LEVEL: 141 MEQ/L (ref 136–145)
TOTAL PROTEIN: 6.3 GM/DL (ref 6.4–8.2)
WHITE BLOOD COUNT: 9.7 10^3/UL (ref 4–10)

## 2018-07-04 RX ADMIN — INSULIN LISPRO 1 UNITS: 100 INJECTION, SOLUTION INTRAVENOUS; SUBCUTANEOUS at 21:00

## 2018-07-04 RX ADMIN — PREGABALIN 1 MG: 50 CAPSULE ORAL at 15:14

## 2018-07-04 RX ADMIN — NYSTATIN 1 DOSE: 100000 POWDER TOPICAL at 09:53

## 2018-07-04 RX ADMIN — FUROSEMIDE 1 MG: 20 TABLET ORAL at 15:14

## 2018-07-04 RX ADMIN — INSULIN LISPRO 4 UNITS: 100 INJECTION, SOLUTION INTRAVENOUS; SUBCUTANEOUS at 17:57

## 2018-07-04 RX ADMIN — CLOPIDOGREL BISULFATE 1 MG: 75 TABLET ORAL at 09:52

## 2018-07-04 RX ADMIN — DEXTROSE MONOHYDRATE 1 MLS/HR: 5 INJECTION INTRAVENOUS at 04:51

## 2018-07-04 RX ADMIN — Medication 1 MG: at 21:02

## 2018-07-04 RX ADMIN — Medication 1 MG: at 09:52

## 2018-07-04 RX ADMIN — FAMOTIDINE 1 MG: 20 TABLET, FILM COATED ORAL at 09:51

## 2018-07-04 RX ADMIN — DEXTROSE MONOHYDRATE 1 MLS/HR: 5 INJECTION INTRAVENOUS at 09:51

## 2018-07-04 RX ADMIN — DEXTROSE MONOHYDRATE 1 MLS/HR: 5 INJECTION INTRAVENOUS at 16:36

## 2018-07-04 RX ADMIN — MORPHINE SULFATE 1 MG: 15 TABLET, EXTENDED RELEASE ORAL at 21:03

## 2018-07-04 RX ADMIN — DIVALPROEX SODIUM 1 MG: 500 TABLET, DELAYED RELEASE ORAL at 21:02

## 2018-07-04 RX ADMIN — ENOXAPARIN SODIUM 1 MG: 40 INJECTION SUBCUTANEOUS at 09:53

## 2018-07-04 RX ADMIN — DOCUSATE SODIUM,SENNOSIDES 1 TAB: 50; 8.6 TABLET, FILM COATED ORAL at 21:02

## 2018-07-04 RX ADMIN — FLUTICASONE PROPIONATE AND SALMETEROL XINAFOATE 1 PUFF: 115; 21 AEROSOL, METERED RESPIRATORY (INHALATION) at 21:22

## 2018-07-04 RX ADMIN — DULOXETINE HYDROCHLORIDE 1 MG: 30 CAPSULE, DELAYED RELEASE ORAL at 09:51

## 2018-07-04 RX ADMIN — NYSTATIN 1 DOSE: 100000 POWDER TOPICAL at 21:04

## 2018-07-04 RX ADMIN — FLUTICASONE PROPIONATE AND SALMETEROL XINAFOATE 1 PUFF: 115; 21 AEROSOL, METERED RESPIRATORY (INHALATION) at 09:02

## 2018-07-04 RX ADMIN — ASPIRIN 81 MG CHEWABLE TABLET 1 MG: 81 TABLET CHEWABLE at 09:51

## 2018-07-04 RX ADMIN — INSULIN DETEMIR 1 UNITS: 100 INJECTION, SOLUTION SUBCUTANEOUS at 09:50

## 2018-07-04 RX ADMIN — DIVALPROEX SODIUM 1 MG: 500 TABLET, DELAYED RELEASE ORAL at 09:51

## 2018-07-04 RX ADMIN — DOCUSATE SODIUM,SENNOSIDES 1 TAB: 50; 8.6 TABLET, FILM COATED ORAL at 09:52

## 2018-07-04 RX ADMIN — INSULIN DETEMIR 1 UNITS: 100 INJECTION, SOLUTION SUBCUTANEOUS at 21:04

## 2018-07-04 RX ADMIN — DEXTROSE MONOHYDRATE 1 MLS/HR: 50 INJECTION, SOLUTION INTRAVENOUS at 15:15

## 2018-07-04 RX ADMIN — DEXTROSE MONOHYDRATE 1 MLS/HR: 50 INJECTION, SOLUTION INTRAVENOUS at 03:03

## 2018-07-04 RX ADMIN — PREGABALIN 1 MG: 50 CAPSULE ORAL at 04:51

## 2018-07-04 RX ADMIN — MORPHINE SULFATE 1 MG: 15 TABLET, EXTENDED RELEASE ORAL at 09:52

## 2018-07-04 RX ADMIN — INSULIN LISPRO 2 UNITS: 100 INJECTION, SOLUTION INTRAVENOUS; SUBCUTANEOUS at 09:50

## 2018-07-04 RX ADMIN — DEXTROSE MONOHYDRATE 1 MLS/HR: 5 INJECTION INTRAVENOUS at 22:05

## 2018-07-04 RX ADMIN — BACLOFEN 1 MG: 10 TABLET ORAL at 21:02

## 2018-07-04 RX ADMIN — INSULIN LISPRO 4 UNITS: 100 INJECTION, SOLUTION INTRAVENOUS; SUBCUTANEOUS at 12:22

## 2018-07-04 RX ADMIN — BACLOFEN 1 MG: 10 TABLET ORAL at 09:52

## 2018-07-05 LAB
GLUCOSE BLDC GLUCOMTR-MCNC: 109 MG/DL (ref 80–115)
GLUCOSE BLDC GLUCOMTR-MCNC: 133 MG/DL (ref 80–115)
GLUCOSE BLDC GLUCOMTR-MCNC: 175 MG/DL (ref 80–115)
GLUCOSE BLDC GLUCOMTR-MCNC: 186 MG/DL (ref 80–115)

## 2018-07-05 RX ADMIN — BACLOFEN 1 MG: 10 TABLET ORAL at 08:19

## 2018-07-05 RX ADMIN — DEXTROSE MONOHYDRATE 1 MLS/HR: 50 INJECTION, SOLUTION INTRAVENOUS at 15:43

## 2018-07-05 RX ADMIN — Medication 1 MG: at 08:18

## 2018-07-05 RX ADMIN — INSULIN LISPRO 1 UNITS: 100 INJECTION, SOLUTION INTRAVENOUS; SUBCUTANEOUS at 12:00

## 2018-07-05 RX ADMIN — DOCUSATE SODIUM,SENNOSIDES 1 TAB: 50; 8.6 TABLET, FILM COATED ORAL at 08:18

## 2018-07-05 RX ADMIN — CEPHALEXIN 1 MG: 500 CAPSULE ORAL at 21:37

## 2018-07-05 RX ADMIN — DULOXETINE HYDROCHLORIDE 1 MG: 30 CAPSULE, DELAYED RELEASE ORAL at 08:18

## 2018-07-05 RX ADMIN — ENOXAPARIN SODIUM 1 MG: 40 INJECTION SUBCUTANEOUS at 08:17

## 2018-07-05 RX ADMIN — ASPIRIN 81 MG CHEWABLE TABLET 1 MG: 81 TABLET CHEWABLE at 08:17

## 2018-07-05 RX ADMIN — INSULIN LISPRO 1 UNITS: 100 INJECTION, SOLUTION INTRAVENOUS; SUBCUTANEOUS at 21:00

## 2018-07-05 RX ADMIN — MORPHINE SULFATE 1 MG: 15 TABLET, EXTENDED RELEASE ORAL at 08:19

## 2018-07-05 RX ADMIN — MORPHINE SULFATE 1 MG: 15 TABLET, EXTENDED RELEASE ORAL at 21:38

## 2018-07-05 RX ADMIN — DEXTROSE MONOHYDRATE 1 MLS/HR: 50 INJECTION, SOLUTION INTRAVENOUS at 02:13

## 2018-07-05 RX ADMIN — DEXTROSE MONOHYDRATE 1 MLS/HR: 5 INJECTION INTRAVENOUS at 03:37

## 2018-07-05 RX ADMIN — DOCUSATE SODIUM,SENNOSIDES 1 TAB: 50; 8.6 TABLET, FILM COATED ORAL at 21:00

## 2018-07-05 RX ADMIN — INSULIN LISPRO 2 UNITS: 100 INJECTION, SOLUTION INTRAVENOUS; SUBCUTANEOUS at 08:16

## 2018-07-05 RX ADMIN — PREGABALIN 1 MG: 50 CAPSULE ORAL at 04:52

## 2018-07-05 RX ADMIN — NYSTATIN 1 DOSE: 100000 POWDER TOPICAL at 21:39

## 2018-07-05 RX ADMIN — PREGABALIN 1 MG: 50 CAPSULE ORAL at 15:42

## 2018-07-05 RX ADMIN — INSULIN DETEMIR 1 UNITS: 100 INJECTION, SOLUTION SUBCUTANEOUS at 21:37

## 2018-07-05 RX ADMIN — INSULIN LISPRO 4 UNITS: 100 INJECTION, SOLUTION INTRAVENOUS; SUBCUTANEOUS at 16:56

## 2018-07-05 RX ADMIN — DIVALPROEX SODIUM 1 MG: 500 TABLET, DELAYED RELEASE ORAL at 21:37

## 2018-07-05 RX ADMIN — FUROSEMIDE 1 MG: 20 TABLET ORAL at 15:42

## 2018-07-05 RX ADMIN — NYSTATIN 1 DOSE: 100000 POWDER TOPICAL at 09:00

## 2018-07-05 RX ADMIN — Medication 1 MG: at 21:37

## 2018-07-05 RX ADMIN — DEXTROSE MONOHYDRATE 1 MLS/HR: 5 INJECTION INTRAVENOUS at 16:48

## 2018-07-05 RX ADMIN — CLOPIDOGREL BISULFATE 1 MG: 75 TABLET ORAL at 08:19

## 2018-07-05 RX ADMIN — FAMOTIDINE 1 MG: 20 TABLET, FILM COATED ORAL at 08:17

## 2018-07-05 RX ADMIN — DEXTROSE MONOHYDRATE 1 MLS/HR: 50 INJECTION, SOLUTION INTRAVENOUS at 10:13

## 2018-07-05 RX ADMIN — BACLOFEN 1 MG: 10 TABLET ORAL at 21:38

## 2018-07-05 RX ADMIN — INSULIN DETEMIR 1 UNITS: 100 INJECTION, SOLUTION SUBCUTANEOUS at 08:16

## 2018-07-05 RX ADMIN — DIVALPROEX SODIUM 1 MG: 500 TABLET, DELAYED RELEASE ORAL at 21:00

## 2018-07-05 RX ADMIN — FLUTICASONE PROPIONATE AND SALMETEROL XINAFOATE 1 PUFF: 115; 21 AEROSOL, METERED RESPIRATORY (INHALATION) at 21:00

## 2018-07-05 RX ADMIN — DIVALPROEX SODIUM 1 MG: 500 TABLET, DELAYED RELEASE ORAL at 08:17

## 2018-07-05 RX ADMIN — FLUTICASONE PROPIONATE AND SALMETEROL XINAFOATE 1 PUFF: 115; 21 AEROSOL, METERED RESPIRATORY (INHALATION) at 07:24

## 2018-07-05 RX ADMIN — DEXTROSE MONOHYDRATE 1 MLS/HR: 5 INJECTION INTRAVENOUS at 11:18

## 2018-07-06 LAB
GLUCOSE BLDC GLUCOMTR-MCNC: 124 MG/DL (ref 80–115)
GLUCOSE BLDC GLUCOMTR-MCNC: 95 MG/DL (ref 80–115)

## 2018-07-06 RX ADMIN — PREGABALIN 1 MG: 50 CAPSULE ORAL at 05:25

## 2018-07-06 RX ADMIN — FLUTICASONE PROPIONATE AND SALMETEROL XINAFOATE 1 PUFF: 115; 21 AEROSOL, METERED RESPIRATORY (INHALATION) at 07:20

## 2018-07-06 RX ADMIN — INSULIN LISPRO 1 UNITS: 100 INJECTION, SOLUTION INTRAVENOUS; SUBCUTANEOUS at 12:00

## 2018-07-06 RX ADMIN — MORPHINE SULFATE 1 MG: 15 TABLET, EXTENDED RELEASE ORAL at 08:44

## 2018-07-06 RX ADMIN — INSULIN LISPRO 1 UNITS: 100 INJECTION, SOLUTION INTRAVENOUS; SUBCUTANEOUS at 07:30

## 2018-07-06 RX ADMIN — FAMOTIDINE 1 MG: 20 TABLET, FILM COATED ORAL at 08:43

## 2018-07-06 RX ADMIN — BACLOFEN 1 MG: 10 TABLET ORAL at 08:44

## 2018-07-06 RX ADMIN — CEPHALEXIN 1 MG: 500 CAPSULE ORAL at 05:25

## 2018-07-06 RX ADMIN — DULOXETINE HYDROCHLORIDE 1 MG: 30 CAPSULE, DELAYED RELEASE ORAL at 09:00

## 2018-07-06 RX ADMIN — DOCUSATE SODIUM,SENNOSIDES 1 TAB: 50; 8.6 TABLET, FILM COATED ORAL at 09:00

## 2018-07-06 RX ADMIN — ENOXAPARIN SODIUM 1 MG: 40 INJECTION SUBCUTANEOUS at 08:31

## 2018-07-06 RX ADMIN — INSULIN DETEMIR 1 UNITS: 100 INJECTION, SOLUTION SUBCUTANEOUS at 08:30

## 2018-07-06 RX ADMIN — DIVALPROEX SODIUM 1 MG: 500 TABLET, DELAYED RELEASE ORAL at 09:00

## 2018-07-06 RX ADMIN — DIVALPROEX SODIUM 1 MG: 500 TABLET, DELAYED RELEASE ORAL at 08:46

## 2018-07-06 RX ADMIN — ASPIRIN 81 MG CHEWABLE TABLET 1 MG: 81 TABLET CHEWABLE at 09:00

## 2018-07-06 RX ADMIN — Medication 1 MG: at 08:47

## 2018-07-06 RX ADMIN — CLOPIDOGREL BISULFATE 1 MG: 75 TABLET ORAL at 08:41

## 2018-07-06 RX ADMIN — NYSTATIN 1 DOSE: 100000 POWDER TOPICAL at 09:00

## 2018-07-23 ENCOUNTER — HOSPITAL ENCOUNTER (OUTPATIENT)
Dept: HOSPITAL 53 - M RAD | Age: 63
End: 2018-07-23
Attending: NURSE PRACTITIONER
Payer: MEDICAID

## 2018-07-23 ENCOUNTER — HOSPITAL ENCOUNTER (OUTPATIENT)
Dept: HOSPITAL 53 - SKLAB5 | Age: 63
End: 2018-07-23
Attending: FAMILY MEDICINE
Payer: MEDICARE

## 2018-07-23 DIAGNOSIS — R22.40: Primary | ICD-10-CM

## 2018-07-23 DIAGNOSIS — R93.9: Primary | ICD-10-CM

## 2018-07-23 LAB
BASO #: 0.1 10^3/UL (ref 0–0.2)
BASO %: 0.7 % (ref 0–1)
CRP SERPL-MCNC: 0.73 MG/DL (ref 0–0.3)
EOS #: 0.3 10^3/UL (ref 0–0.5)
EOSINOPHIL NFR BLD AUTO: 3.8 % (ref 0–3)
ERYTHROCYTE SEDIMENTATION RATE: 39 MM/HR (ref 0–30)
HEMATOCRIT: 39 % (ref 36–47)
HEMOGLOBIN: 12.1 G/DL (ref 12–15.5)
IMMATURE GRANULOCYTE %: 0.3 % (ref 0–3)
INR: 0.91
LYMPH #: 2.3 10^3/UL (ref 1.5–4.5)
LYMPH %: 30.9 % (ref 24–44)
MEAN CORPUSCULAR HEMOGLOBIN: 27.3 PG (ref 27–33)
MEAN CORPUSCULAR HGB CONC: 31 G/DL (ref 32–36.5)
MEAN CORPUSCULAR VOLUME: 88 FL (ref 80–96)
MONO #: 0.6 10^3/UL (ref 0–0.8)
MONO %: 8.7 % (ref 0–5)
NEUTROPHILS #: 4.1 10^3/UL (ref 1.8–7.7)
NEUTROPHILS %: 55.6 % (ref 36–66)
NRBC BLD AUTO-RTO: 0 % (ref 0–0)
PARTIAL THROMBOPLASTIN TIME: 31.9 SECONDS (ref 25.4–37.6)
PLATELET COUNT, AUTOMATED: 164 10^3/UL (ref 150–450)
PROTHROMBIN TIME: 12.3 SECONDS (ref 12.1–14.4)
RED BLOOD COUNT: 4.43 10^6/UL (ref 4–5.4)
RED CELL DISTRIBUTION WIDTH: 15.9 % (ref 11.5–14.5)
WHITE BLOOD COUNT: 7.4 10^3/UL (ref 4–10)

## 2018-07-23 PROCEDURE — 86140 C-REACTIVE PROTEIN: CPT

## 2018-07-23 PROCEDURE — 93971 EXTREMITY STUDY: CPT

## 2018-08-10 ENCOUNTER — HOSPITAL ENCOUNTER (OUTPATIENT)
Dept: HOSPITAL 53 - M RAD | Age: 63
End: 2018-08-10
Attending: FAMILY MEDICINE
Payer: MEDICARE

## 2018-08-10 DIAGNOSIS — Z12.31: Primary | ICD-10-CM

## 2018-08-10 PROCEDURE — 77067 SCR MAMMO BI INCL CAD: CPT

## 2018-08-22 ENCOUNTER — HOSPITAL ENCOUNTER (OUTPATIENT)
Dept: HOSPITAL 53 - SKLAB5 | Age: 63
End: 2018-08-22
Attending: FAMILY MEDICINE
Payer: MEDICARE

## 2018-08-22 DIAGNOSIS — E11.9: Primary | ICD-10-CM

## 2018-08-22 DIAGNOSIS — R60.9: ICD-10-CM

## 2018-08-22 DIAGNOSIS — E78.5: ICD-10-CM

## 2018-08-22 LAB
ANION GAP: 8 MEQ/L (ref 8–16)
BLOOD UREA NITROGEN: 14 MG/DL (ref 7–18)
CALCIUM LEVEL: 8.5 MG/DL (ref 8.8–10.2)
CARBON DIOXIDE LEVEL: 30 MEQ/L (ref 21–32)
CHLORIDE LEVEL: 99 MEQ/L (ref 98–107)
CREATININE FOR GFR: 0.71 MG/DL (ref 0.55–1.3)
GFR SERPL CREATININE-BSD FRML MDRD: > 60 ML/MIN/{1.73_M2} (ref 45–?)
GLUCOSE, FASTING: 211 MG/DL (ref 70–100)
POTASSIUM SERUM: 4.7 MEQ/L (ref 3.5–5.1)
SODIUM LEVEL: 137 MEQ/L (ref 136–145)

## 2018-08-22 PROCEDURE — 36415 COLL VENOUS BLD VENIPUNCTURE: CPT

## 2018-11-15 ENCOUNTER — HOSPITAL ENCOUNTER (OUTPATIENT)
Dept: HOSPITAL 53 - SKLAB5 | Age: 63
End: 2018-11-15
Attending: FAMILY MEDICINE
Payer: MEDICARE

## 2018-11-15 DIAGNOSIS — E11.9: Primary | ICD-10-CM

## 2018-11-15 LAB — EST. AVERAGE GLUCOSE BLD GHB EST-MCNC: 171 MG/DL (ref 60–110)

## 2018-11-15 PROCEDURE — 83036 HEMOGLOBIN GLYCOSYLATED A1C: CPT

## 2018-11-21 ENCOUNTER — HOSPITAL ENCOUNTER (OUTPATIENT)
Dept: HOSPITAL 53 - SKLAB5 | Age: 63
End: 2018-11-21
Attending: FAMILY MEDICINE
Payer: MEDICARE

## 2018-11-21 DIAGNOSIS — R00.0: Primary | ICD-10-CM

## 2018-11-21 DIAGNOSIS — R07.89: Primary | ICD-10-CM

## 2018-11-21 LAB
HEMATOCRIT: 36 % (ref 36–47)
HEMOGLOBIN: 11.2 G/DL (ref 12–15.5)
MEAN CORPUSCULAR HEMOGLOBIN: 26.5 PG (ref 27–33)
MEAN CORPUSCULAR HGB CONC: 31.1 G/DL (ref 32–36.5)
MEAN CORPUSCULAR VOLUME: 85.1 FL (ref 80–96)
NRBC BLD AUTO-RTO: 0 % (ref 0–0)
NT-PRO BNP: 199 PG/ML (ref ?–125)
PLATELET COUNT, AUTOMATED: 265 10^3/UL (ref 150–450)
RED BLOOD COUNT: 4.23 10^6/UL (ref 4–5.4)
RED CELL DISTRIBUTION WIDTH: 15.9 % (ref 11.5–14.5)
TROPONIN I: < 0.02 NG/ML (ref ?–0.1)
WHITE BLOOD COUNT: 10.6 10^3/UL (ref 4–10)

## 2018-11-21 PROCEDURE — 93005 ELECTROCARDIOGRAM TRACING: CPT

## 2018-11-21 PROCEDURE — 71046 X-RAY EXAM CHEST 2 VIEWS: CPT

## 2018-11-26 ENCOUNTER — HOSPITAL ENCOUNTER (OUTPATIENT)
Dept: HOSPITAL 53 - SKLAB5 | Age: 63
End: 2018-11-26
Attending: FAMILY MEDICINE
Payer: MEDICARE

## 2018-11-26 DIAGNOSIS — R07.89: Primary | ICD-10-CM

## 2018-11-26 PROCEDURE — 93005 ELECTROCARDIOGRAM TRACING: CPT

## 2018-11-27 ENCOUNTER — HOSPITAL ENCOUNTER (EMERGENCY)
Dept: HOSPITAL 53 - M ED | Age: 63
Discharge: HOME | End: 2018-11-27
Payer: MEDICARE

## 2018-11-27 DIAGNOSIS — Z88.1: ICD-10-CM

## 2018-11-27 DIAGNOSIS — Z79.84: ICD-10-CM

## 2018-11-27 DIAGNOSIS — Z79.899: ICD-10-CM

## 2018-11-27 DIAGNOSIS — Z79.82: ICD-10-CM

## 2018-11-27 DIAGNOSIS — Z79.02: ICD-10-CM

## 2018-11-27 DIAGNOSIS — I25.2: ICD-10-CM

## 2018-11-27 DIAGNOSIS — M51.36: ICD-10-CM

## 2018-11-27 DIAGNOSIS — Z86.718: ICD-10-CM

## 2018-11-27 DIAGNOSIS — R04.0: Primary | ICD-10-CM

## 2018-11-27 DIAGNOSIS — F41.9: ICD-10-CM

## 2018-11-27 DIAGNOSIS — J44.9: ICD-10-CM

## 2018-11-27 DIAGNOSIS — F31.9: ICD-10-CM

## 2018-11-27 DIAGNOSIS — E78.00: ICD-10-CM

## 2018-11-27 DIAGNOSIS — Z79.891: ICD-10-CM

## 2018-11-27 DIAGNOSIS — R56.9: ICD-10-CM

## 2018-11-27 DIAGNOSIS — G62.9: ICD-10-CM

## 2018-11-27 DIAGNOSIS — J45.909: ICD-10-CM

## 2018-11-27 DIAGNOSIS — Z79.4: ICD-10-CM

## 2018-11-27 DIAGNOSIS — I10: ICD-10-CM

## 2018-11-27 DIAGNOSIS — K21.9: ICD-10-CM

## 2018-11-27 DIAGNOSIS — E11.9: ICD-10-CM

## 2018-11-27 DIAGNOSIS — Z95.1: ICD-10-CM

## 2018-11-27 DIAGNOSIS — M51.26: ICD-10-CM

## 2018-11-27 DIAGNOSIS — R07.9: ICD-10-CM

## 2018-11-27 DIAGNOSIS — Z79.01: ICD-10-CM

## 2018-11-27 PROCEDURE — 30901 CONTROL OF NOSEBLEED: CPT

## 2019-01-03 ENCOUNTER — HOSPITAL ENCOUNTER (OUTPATIENT)
Dept: HOSPITAL 53 - SKLAB5 | Age: 64
End: 2019-01-03
Attending: FAMILY MEDICINE
Payer: MEDICARE

## 2019-01-03 DIAGNOSIS — E78.5: Primary | ICD-10-CM

## 2019-01-03 DIAGNOSIS — E11.9: ICD-10-CM

## 2019-01-03 DIAGNOSIS — R56.9: ICD-10-CM

## 2019-01-03 LAB
CHOLEST SERPL-MCNC: 134 MG/DL (ref ?–200)
CHOLEST/HDLC SERPL: 3.19 {RATIO} (ref ?–5)
EST. AVERAGE GLUCOSE BLD GHB EST-MCNC: 192 MG/DL (ref 60–110)
HDLC SERPL-MCNC: 42 MG/DL (ref 40–?)
LDLC SERPL CALC-MCNC: 64 MG/DL (ref ?–100)
NONHDLC SERPL-MCNC: 92 MG/DL
TRIGL SERPL-MCNC: 142 MG/DL (ref ?–150)
VALPROATE SERPL-MCNC: 84 UG/ML (ref 50–100)

## 2019-02-25 ENCOUNTER — HOSPITAL ENCOUNTER (OUTPATIENT)
Dept: HOSPITAL 53 - SKLAB5 | Age: 64
End: 2019-02-25
Attending: FAMILY MEDICINE
Payer: MEDICARE

## 2019-02-25 DIAGNOSIS — R10.9: Primary | ICD-10-CM

## 2019-02-25 LAB
APPEARANCE UR: CLEAR
BACTERIA UR QL AUTO: NEGATIVE
BILIRUB UR QL STRIP.AUTO: NEGATIVE
BUN SERPL-MCNC: 19 MG/DL (ref 7–18)
CALCIUM SERPL-MCNC: 8.3 MG/DL (ref 8.8–10.2)
CHLORIDE SERPL-SCNC: 99 MEQ/L (ref 98–107)
CO2 SERPL-SCNC: 29 MEQ/L (ref 21–32)
CREAT SERPL-MCNC: 0.71 MG/DL (ref 0.55–1.3)
GFR SERPL CREATININE-BSD FRML MDRD: > 60 ML/MIN/{1.73_M2} (ref 45–?)
GLUCOSE SERPL-MCNC: 151 MG/DL (ref 70–100)
GLUCOSE UR QL STRIP.AUTO: NEGATIVE MG/DL
HCT VFR BLD AUTO: 31.6 % (ref 36–47)
HGB BLD-MCNC: 9.6 G/DL (ref 12–15.5)
HGB UR QL STRIP.AUTO: NEGATIVE
KETONES UR QL STRIP.AUTO: NEGATIVE MG/DL
LEUKOCYTE ESTERASE UR QL STRIP.AUTO: NEGATIVE
MCH RBC QN AUTO: 24.9 PG (ref 27–33)
MCHC RBC AUTO-ENTMCNC: 30.4 G/DL (ref 32–36.5)
MCV RBC AUTO: 82.1 FL (ref 80–96)
NITRITE UR QL STRIP.AUTO: NEGATIVE
PH UR STRIP.AUTO: 8 UNITS (ref 5–9)
PLATELET # BLD AUTO: 427 10^3/UL (ref 150–450)
POTASSIUM SERPL-SCNC: 4.5 MEQ/L (ref 3.5–5.1)
PROT UR QL STRIP.AUTO: NEGATIVE MG/DL
RBC # BLD AUTO: 3.85 10^6/UL (ref 4–5.4)
RBC # UR AUTO: 1 /HPF (ref 0–3)
SODIUM SERPL-SCNC: 134 MEQ/L (ref 136–145)
SP GR UR STRIP.AUTO: 1.01 (ref 1–1.03)
SQUAMOUS #/AREA URNS AUTO: 1 /HPF (ref 0–6)
UROBILINOGEN UR QL STRIP.AUTO: 4 MG/DL (ref 0–2)
WBC # BLD AUTO: 10.8 10^3/UL (ref 4–10)
WBC #/AREA URNS AUTO: 2 /HPF (ref 0–3)

## 2019-02-26 ENCOUNTER — HOSPITAL ENCOUNTER (OUTPATIENT)
Dept: HOSPITAL 53 - SKLAB5 | Age: 64
End: 2019-02-26
Attending: FAMILY MEDICINE
Payer: MEDICARE

## 2019-02-26 DIAGNOSIS — J18.9: Primary | ICD-10-CM

## 2019-02-26 NOTE — REP
Clinical:  Pneumonia.

 

Technique:  PA and lateral.

 

Comparison:  11/21/2018.

 

Findings:

Mediastinum and cardiac silhouette are stable.  Moderate right lower lobe

infiltrate is suggested along with possible bibasilar atelectasis.  No effusion.

No pneumothorax.  Skeletal structures demonstrate age-related degenerative

changes and osteopenia.

 

Impression:

Right lower lobe infiltrate suggested along with bibasilar atelectasis.

 

 

Electronically Signed by

Martir Newberry MD 02/26/2019 09:15 A

## 2019-04-04 ENCOUNTER — HOSPITAL ENCOUNTER (OUTPATIENT)
Dept: HOSPITAL 53 - SKLAB5 | Age: 64
End: 2019-04-04
Attending: FAMILY MEDICINE
Payer: MEDICARE

## 2019-04-04 DIAGNOSIS — E11.9: Primary | ICD-10-CM

## 2019-04-05 ENCOUNTER — HOSPITAL ENCOUNTER (OUTPATIENT)
Dept: HOSPITAL 53 - SKLAB5 | Age: 64
End: 2019-04-05
Attending: FAMILY MEDICINE
Payer: MEDICARE

## 2019-04-05 DIAGNOSIS — Z13.29: ICD-10-CM

## 2019-04-05 DIAGNOSIS — D64.9: Primary | ICD-10-CM

## 2019-04-05 DIAGNOSIS — R10.9: ICD-10-CM

## 2019-04-05 DIAGNOSIS — K59.00: ICD-10-CM

## 2019-04-05 DIAGNOSIS — Z79.899: ICD-10-CM

## 2019-04-05 LAB
ALBUMIN SERPL BCG-MCNC: 3.4 GM/DL (ref 3.2–5.2)
ALT SERPL W P-5'-P-CCNC: 13 U/L (ref 12–78)
BILIRUB SERPL-MCNC: 0.4 MG/DL (ref 0.2–1)
BUN SERPL-MCNC: 20 MG/DL (ref 7–18)
CALCIUM SERPL-MCNC: 9 MG/DL (ref 8.8–10.2)
CHLORIDE SERPL-SCNC: 102 MEQ/L (ref 98–107)
CO2 SERPL-SCNC: 26 MEQ/L (ref 21–32)
CREAT SERPL-MCNC: 0.94 MG/DL (ref 0.55–1.3)
FERRITIN SERPL-MCNC: 31 NG/ML (ref 8–252)
GFR SERPL CREATININE-BSD FRML MDRD: > 60 ML/MIN/{1.73_M2} (ref 45–?)
GLUCOSE SERPL-MCNC: 158 MG/DL (ref 70–100)
HCT VFR BLD AUTO: 40.6 % (ref 36–47)
HGB BLD-MCNC: 12.2 G/DL (ref 12–15.5)
IRON SATN MFR SERPL: 4.1 % (ref 13.2–45)
IRON SERPL-MCNC: 18 UG/DL (ref 50–170)
MCH RBC QN AUTO: 24.4 PG (ref 27–33)
MCHC RBC AUTO-ENTMCNC: 30 G/DL (ref 32–36.5)
MCV RBC AUTO: 81.4 FL (ref 80–96)
PLATELET # BLD AUTO: 283 10^3/UL (ref 150–450)
POTASSIUM SERPL-SCNC: 3.3 MEQ/L (ref 3.5–5.1)
PROT SERPL-MCNC: 8.9 GM/DL (ref 6.4–8.2)
RBC # BLD AUTO: 4.99 10^6/UL (ref 4–5.4)
SODIUM SERPL-SCNC: 139 MEQ/L (ref 136–145)
TIBC SERPL-MCNC: 444 UG/DL (ref 250–450)
WBC # BLD AUTO: 17.7 10^3/UL (ref 4–10)

## 2019-04-05 NOTE — REP
ACUTE ABDOMINAL SERIES:  Three views.

 

HISTORY:  Constipation.  Distension.

 

COMPARISON STUDY:  February 26, 2019 and February 25, 2019.

 

FINDINGS:  Upright chest radiograph shows a new somewhat nodular infiltrate in

the left lung field.  Question pneumonia.  Right lung is essentially clear.

There is no evidence of free subdiaphragmatic air.  Heart is not enlarged.

 

Supine and erect views of the abdomen show a pin in the left hip and levoconvex

scoliotic curve.  The bowel gas pattern is normal.  Vascular calcification is

seen without evidence of aneurysm.  No evidence of free intraperitoneal air or

significant air fluid level.  No large or small bowel dilation is seen.

 

IMPRESSION: Normal bowel gas pattern.  Increased density consistent with

infiltrate left lower lung field.

 

 

Electronically Signed by

Vladimir Rg MD 04/05/2019 01:36 P

## 2019-04-06 ENCOUNTER — HOSPITAL ENCOUNTER (OUTPATIENT)
Dept: HOSPITAL 53 - M LAB | Age: 64
End: 2019-04-06
Attending: FAMILY MEDICINE
Payer: MEDICARE

## 2019-04-06 DIAGNOSIS — D64.9: Primary | ICD-10-CM

## 2019-04-06 DIAGNOSIS — Z79.82: ICD-10-CM

## 2019-04-06 DIAGNOSIS — Z79.899: ICD-10-CM

## 2019-04-11 ENCOUNTER — HOSPITAL ENCOUNTER (OUTPATIENT)
Dept: HOSPITAL 53 - SKLAB5 | Age: 64
End: 2019-04-11
Attending: FAMILY MEDICINE
Payer: MEDICARE

## 2019-04-11 DIAGNOSIS — E87.6: Primary | ICD-10-CM

## 2019-04-11 LAB
BUN SERPL-MCNC: 14 MG/DL (ref 7–18)
CALCIUM SERPL-MCNC: 9.5 MG/DL (ref 8.8–10.2)
CHLORIDE SERPL-SCNC: 99 MEQ/L (ref 98–107)
CO2 SERPL-SCNC: 30 MEQ/L (ref 21–32)
CREAT SERPL-MCNC: 0.66 MG/DL (ref 0.55–1.3)
GFR SERPL CREATININE-BSD FRML MDRD: > 60 ML/MIN/{1.73_M2} (ref 45–?)
GLUCOSE SERPL-MCNC: 140 MG/DL (ref 70–100)
POTASSIUM SERPL-SCNC: 4.6 MEQ/L (ref 3.5–5.1)
SODIUM SERPL-SCNC: 136 MEQ/L (ref 136–145)

## 2019-06-06 ENCOUNTER — HOSPITAL ENCOUNTER (OUTPATIENT)
Dept: HOSPITAL 53 - SKLAB5 | Age: 64
End: 2019-06-06
Attending: FAMILY MEDICINE
Payer: MEDICARE

## 2019-06-06 DIAGNOSIS — D64.9: Primary | ICD-10-CM

## 2019-06-06 LAB
BASOPHILS # BLD AUTO: 0.1 10^3/UL (ref 0–0.2)
BASOPHILS NFR BLD AUTO: 0.7 % (ref 0–1)
EOSINOPHIL # BLD AUTO: 0.2 10^3/UL (ref 0–0.5)
EOSINOPHIL NFR BLD AUTO: 2.4 % (ref 0–3)
HCT VFR BLD AUTO: 38 % (ref 36–47)
HGB BLD-MCNC: 11.6 G/DL (ref 12–15.5)
LYMPHOCYTES # BLD AUTO: 3.1 10^3/UL (ref 1.5–4.5)
LYMPHOCYTES NFR BLD AUTO: 35.3 % (ref 24–44)
MCH RBC QN AUTO: 25.7 PG (ref 27–33)
MCHC RBC AUTO-ENTMCNC: 30.5 G/DL (ref 32–36.5)
MCV RBC AUTO: 84.3 FL (ref 80–96)
MONOCYTES # BLD AUTO: 0.6 10^3/UL (ref 0–0.8)
MONOCYTES NFR BLD AUTO: 6.5 % (ref 0–5)
NEUTROPHILS # BLD AUTO: 4.8 10^3/UL (ref 1.8–7.7)
NEUTROPHILS NFR BLD AUTO: 54.4 % (ref 36–66)
PLATELET # BLD AUTO: 307 10^3/UL (ref 150–450)
RBC # BLD AUTO: 4.51 10^6/UL (ref 4–5.4)
WBC # BLD AUTO: 8.8 10^3/UL (ref 4–10)

## 2019-07-05 ENCOUNTER — HOSPITAL ENCOUNTER (OUTPATIENT)
Dept: HOSPITAL 53 - SKLAB5 | Age: 64
End: 2019-07-05
Attending: FAMILY MEDICINE
Payer: MEDICARE

## 2019-07-05 DIAGNOSIS — E11.9: Primary | ICD-10-CM

## 2019-07-05 LAB
CHOLEST SERPL-MCNC: 129 MG/DL (ref ?–200)
CHOLEST/HDLC SERPL: 3.23 {RATIO} (ref ?–5)
EST. AVERAGE GLUCOSE BLD GHB EST-MCNC: 237 MG/DL (ref 60–110)
HDLC SERPL-MCNC: 40 MG/DL (ref 40–?)
LDLC SERPL CALC-MCNC: 62 MG/DL (ref ?–100)
NONHDLC SERPL-MCNC: 89 MG/DL
TRIGL SERPL-MCNC: 136 MG/DL (ref ?–150)

## 2019-07-29 ENCOUNTER — HOSPITAL ENCOUNTER (OUTPATIENT)
Dept: HOSPITAL 53 - SKLAB5 | Age: 64
End: 2019-07-29
Attending: FAMILY MEDICINE
Payer: MEDICARE

## 2019-07-29 DIAGNOSIS — R06.00: Primary | ICD-10-CM

## 2019-07-29 NOTE — REP
Clinical:  Dyspnea.

 

Comparison:  02/26/2019.

 

Findings:

Evaluation is limited by portable technique and underpenetration which accentuate

the pulmonary vasculature.  Interstitial edema as well as scattered left

suprahilar and right basilar atelectasis cannot be excluded.  No obvious

effusion.  No pneumothorax.  Skeletal structures stable.

 

Impression:

Limited examination.  Cannot exclude the suprahilar or right basilar atelectasis

as well as possible interstitial edema.

 

 

Electronically Signed by

Martir Newberry MD 07/29/2019 02:43 P

## 2019-09-05 ENCOUNTER — HOSPITAL ENCOUNTER (OUTPATIENT)
Dept: HOSPITAL 53 - SKLAB5 | Age: 64
End: 2019-09-05
Attending: FAMILY MEDICINE
Payer: MEDICARE

## 2019-09-05 DIAGNOSIS — D64.9: Primary | ICD-10-CM

## 2019-09-05 LAB
BASOPHILS # BLD AUTO: 0.1 10^3/UL (ref 0–0.2)
BASOPHILS NFR BLD AUTO: 0.6 % (ref 0–1)
EOSINOPHIL # BLD AUTO: 0.2 10^3/UL (ref 0–0.5)
EOSINOPHIL NFR BLD AUTO: 2.4 % (ref 0–3)
HCT VFR BLD AUTO: 40.6 % (ref 36–47)
HGB BLD-MCNC: 12.7 G/DL (ref 12–15.5)
LYMPHOCYTES # BLD AUTO: 2.9 10^3/UL (ref 1.5–5)
LYMPHOCYTES NFR BLD AUTO: 33.6 % (ref 24–44)
MCH RBC QN AUTO: 27 PG (ref 27–33)
MCHC RBC AUTO-ENTMCNC: 31.3 G/DL (ref 32–36.5)
MCV RBC AUTO: 86.2 FL (ref 80–96)
MONOCYTES # BLD AUTO: 0.6 10^3/UL (ref 0–0.8)
MONOCYTES NFR BLD AUTO: 6.9 % (ref 0–5)
NEUTROPHILS # BLD AUTO: 4.8 10^3/UL (ref 1.5–8.5)
NEUTROPHILS NFR BLD AUTO: 55.7 % (ref 36–66)
PLATELET # BLD AUTO: 248 10^3/UL (ref 150–450)
RBC # BLD AUTO: 4.71 10^6/UL (ref 4–5.4)
WBC # BLD AUTO: 8.6 10^3/UL (ref 4–10)

## 2019-10-03 ENCOUNTER — HOSPITAL ENCOUNTER (OUTPATIENT)
Dept: HOSPITAL 53 - SKLAB5 | Age: 64
End: 2019-10-03
Attending: FAMILY MEDICINE
Payer: MEDICARE

## 2019-10-03 DIAGNOSIS — D64.9: Primary | ICD-10-CM

## 2019-10-03 DIAGNOSIS — E11.9: ICD-10-CM

## 2019-10-03 LAB
BASOPHILS # BLD AUTO: 0.1 10^3/UL (ref 0–0.2)
BASOPHILS NFR BLD AUTO: 0.7 % (ref 0–1)
CREAT UR-MCNC: 120 MG/DL
EOSINOPHIL # BLD AUTO: 0.3 10^3/UL (ref 0–0.5)
EOSINOPHIL NFR BLD AUTO: 2.7 % (ref 0–3)
HCT VFR BLD AUTO: 40.4 % (ref 36–47)
HGB BLD-MCNC: 12.4 G/DL (ref 12–15.5)
LYMPHOCYTES # BLD AUTO: 4.1 10^3/UL (ref 1.5–5)
LYMPHOCYTES NFR BLD AUTO: 38.7 % (ref 24–44)
MCH RBC QN AUTO: 26.8 PG (ref 27–33)
MCHC RBC AUTO-ENTMCNC: 30.7 G/DL (ref 32–36.5)
MCV RBC AUTO: 87.4 FL (ref 80–96)
MICROALBUMIN UR-MCNC: 111 MG/L
MICROALBUMIN/CREAT UR: 92.5 MCG/MG (ref 0–30)
MONOCYTES # BLD AUTO: 0.7 10^3/UL (ref 0–0.8)
MONOCYTES NFR BLD AUTO: 6.7 % (ref 0–5)
NEUTROPHILS # BLD AUTO: 5.3 10^3/UL (ref 1.5–8.5)
NEUTROPHILS NFR BLD AUTO: 50.5 % (ref 36–66)
PLATELET # BLD AUTO: 229 10^3/UL (ref 150–450)
RBC # BLD AUTO: 4.62 10^6/UL (ref 4–5.4)
WBC # BLD AUTO: 10.5 10^3/UL (ref 4–10)

## 2019-10-17 ENCOUNTER — HOSPITAL ENCOUNTER (OUTPATIENT)
Dept: HOSPITAL 53 - SKLAB5 | Age: 64
End: 2019-10-17
Attending: FAMILY MEDICINE
Payer: MEDICARE

## 2019-10-17 DIAGNOSIS — I10: Primary | ICD-10-CM

## 2019-10-17 DIAGNOSIS — E11.9: ICD-10-CM

## 2019-10-17 LAB
BUN SERPL-MCNC: 25 MG/DL (ref 7–18)
CALCIUM SERPL-MCNC: 9.4 MG/DL (ref 8.8–10.2)
CHLORIDE SERPL-SCNC: 98 MEQ/L (ref 98–107)
CO2 SERPL-SCNC: 29 MEQ/L (ref 21–32)
CREAT SERPL-MCNC: 0.9 MG/DL (ref 0.55–1.3)
GFR SERPL CREATININE-BSD FRML MDRD: > 60 ML/MIN/{1.73_M2} (ref 45–?)
GLUCOSE SERPL-MCNC: 261 MG/DL (ref 70–100)
POTASSIUM SERPL-SCNC: 4.7 MEQ/L (ref 3.5–5.1)
SODIUM SERPL-SCNC: 134 MEQ/L (ref 136–145)

## 2019-11-07 ENCOUNTER — HOSPITAL ENCOUNTER (OUTPATIENT)
Dept: HOSPITAL 53 - SKLAB5 | Age: 64
End: 2019-11-07
Attending: FAMILY MEDICINE
Payer: MEDICARE

## 2019-11-07 DIAGNOSIS — D64.9: Primary | ICD-10-CM

## 2019-11-07 LAB
BASOPHILS # BLD AUTO: 0.1 10^3/UL (ref 0–0.2)
BASOPHILS NFR BLD AUTO: 0.8 % (ref 0–1)
EOSINOPHIL # BLD AUTO: 0.3 10^3/UL (ref 0–0.5)
EOSINOPHIL NFR BLD AUTO: 3.2 % (ref 0–3)
HCT VFR BLD AUTO: 41.1 % (ref 36–47)
HGB BLD-MCNC: 12.2 G/DL (ref 12–15.5)
LYMPHOCYTES # BLD AUTO: 3.5 10^3/UL (ref 1.5–5)
LYMPHOCYTES NFR BLD AUTO: 37.5 % (ref 24–44)
MCH RBC QN AUTO: 26.4 PG (ref 27–33)
MCHC RBC AUTO-ENTMCNC: 29.7 G/DL (ref 32–36.5)
MCV RBC AUTO: 89 FL (ref 80–96)
MONOCYTES # BLD AUTO: 0.6 10^3/UL (ref 0–0.8)
MONOCYTES NFR BLD AUTO: 6 % (ref 0–5)
NEUTROPHILS # BLD AUTO: 4.8 10^3/UL (ref 1.5–8.5)
NEUTROPHILS NFR BLD AUTO: 51.7 % (ref 36–66)
PLATELET # BLD AUTO: 280 10^3/UL (ref 150–450)
RBC # BLD AUTO: 4.62 10^6/UL (ref 4–5.4)
WBC # BLD AUTO: 9.3 10^3/UL (ref 4–10)

## 2019-12-05 ENCOUNTER — HOSPITAL ENCOUNTER (OUTPATIENT)
Dept: HOSPITAL 53 - SKLAB5 | Age: 64
End: 2019-12-05
Attending: FAMILY MEDICINE
Payer: MEDICARE

## 2019-12-05 DIAGNOSIS — D64.9: Primary | ICD-10-CM

## 2019-12-05 LAB
BASOPHILS # BLD AUTO: 0.1 10^3/UL (ref 0–0.2)
BASOPHILS NFR BLD AUTO: 0.6 % (ref 0–1)
EOSINOPHIL # BLD AUTO: 0.3 10^3/UL (ref 0–0.5)
EOSINOPHIL NFR BLD AUTO: 2.6 % (ref 0–3)
HCT VFR BLD AUTO: 39.4 % (ref 36–47)
HGB BLD-MCNC: 11.8 G/DL (ref 12–15.5)
LYMPHOCYTES # BLD AUTO: 3.5 10^3/UL (ref 1.5–5)
LYMPHOCYTES NFR BLD AUTO: 32 % (ref 24–44)
MCH RBC QN AUTO: 26.5 PG (ref 27–33)
MCHC RBC AUTO-ENTMCNC: 29.9 G/DL (ref 32–36.5)
MCV RBC AUTO: 88.3 FL (ref 80–96)
MONOCYTES # BLD AUTO: 0.8 10^3/UL (ref 0–0.8)
MONOCYTES NFR BLD AUTO: 7.6 % (ref 0–5)
NEUTROPHILS # BLD AUTO: 6.1 10^3/UL (ref 1.5–8.5)
NEUTROPHILS NFR BLD AUTO: 56.3 % (ref 36–66)
PLATELET # BLD AUTO: 288 10^3/UL (ref 150–450)
RBC # BLD AUTO: 4.46 10^6/UL (ref 4–5.4)
WBC # BLD AUTO: 10.8 10^3/UL (ref 4–10)

## 2019-12-11 ENCOUNTER — HOSPITAL ENCOUNTER (OUTPATIENT)
Dept: HOSPITAL 53 - SKLAB5 | Age: 64
End: 2019-12-11
Attending: FAMILY MEDICINE
Payer: MEDICARE

## 2019-12-11 DIAGNOSIS — J18.1: Primary | ICD-10-CM

## 2019-12-11 DIAGNOSIS — R06.00: ICD-10-CM

## 2019-12-11 LAB
BUN SERPL-MCNC: 34 MG/DL (ref 7–18)
CALCIUM SERPL-MCNC: 10.1 MG/DL (ref 8.8–10.2)
CHLORIDE SERPL-SCNC: 100 MEQ/L (ref 98–107)
CO2 SERPL-SCNC: 30 MEQ/L (ref 21–32)
CREAT SERPL-MCNC: 0.81 MG/DL (ref 0.55–1.3)
GFR SERPL CREATININE-BSD FRML MDRD: > 60 ML/MIN/{1.73_M2} (ref 45–?)
GLUCOSE SERPL-MCNC: 85 MG/DL (ref 70–100)
HCT VFR BLD AUTO: 38.6 % (ref 36–47)
HGB BLD-MCNC: 11.8 G/DL (ref 12–15.5)
MCH RBC QN AUTO: 26.3 PG (ref 27–33)
MCHC RBC AUTO-ENTMCNC: 30.6 G/DL (ref 32–36.5)
MCV RBC AUTO: 86.2 FL (ref 80–96)
PLATELET # BLD AUTO: 290 10^3/UL (ref 150–450)
POTASSIUM SERPL-SCNC: 4.1 MEQ/L (ref 3.5–5.1)
RBC # BLD AUTO: 4.48 10^6/UL (ref 4–5.4)
SODIUM SERPL-SCNC: 136 MEQ/L (ref 136–145)
TROPONIN I SERPL-MCNC: < 0.02 NG/ML (ref ?–0.1)
WBC # BLD AUTO: 11.4 10^3/UL (ref 4–10)

## 2019-12-11 NOTE — ECGEPIP
[facility]

                                       

                                       Test Date:    2019

Pat Name:     PENNY MARQUEZ       Department:   

Patient ID:   [id number]                 Room:         -

Gender:       Female                   Technician:   

:          1955               Requested By: TABITHA PARK [facility]

Order Number: [id number]     Reading MD:   Charanjit Pineda

                                 Measurements

Intervals                              Axis          

Rate:         97                       P:            61

MT:           156                      QRS:          25

QRSD:         90                       T:            -14

QT:           347                                    

QTc:          442                                    

                           Interpretive Statements

SINUS RHYTHM

POSSIBLE OLD INFERIOR MYOCARDIAL INFARCT

Poor R-wave progression

No significant change compared with 2018

Electronically Signed on 2019 21:35:38 EST by Charanjit Pineda

## 2019-12-11 NOTE — REP
Single view chest:  12/11/2019.

 

Indication:  Dyspnea.

 

Comparison:  07/29/2019.

 

Findings:

 

Air space consolidation and interstitial thickening are present within the right

lung base.  There is no pleural effusion or pneumothorax.  The cardiomediastinal

silhouette is unremarkable.

 

Impression:

 

Right lower lobe pneumonia.

 

 

 

 

Electronically Signed by

Aravind Pugh DO 12/11/2019 04:39 P

## 2020-01-02 ENCOUNTER — HOSPITAL ENCOUNTER (OUTPATIENT)
Dept: HOSPITAL 53 - SKLAB5 | Age: 65
End: 2020-01-02
Attending: FAMILY MEDICINE
Payer: MEDICARE

## 2020-01-02 DIAGNOSIS — E11.9: ICD-10-CM

## 2020-01-02 DIAGNOSIS — R56.9: Primary | ICD-10-CM

## 2020-01-02 DIAGNOSIS — E78.5: ICD-10-CM

## 2020-01-02 LAB
CHOLEST SERPL-MCNC: 126 MG/DL (ref ?–200)
CHOLEST/HDLC SERPL: 3.23 {RATIO} (ref ?–5)
EST. AVERAGE GLUCOSE BLD GHB EST-MCNC: 180 MG/DL (ref 60–110)
HDLC SERPL-MCNC: 39 MG/DL (ref 40–?)
LDLC SERPL CALC-MCNC: 56 MG/DL (ref ?–100)
NONHDLC SERPL-MCNC: 87 MG/DL
TRIGL SERPL-MCNC: 153 MG/DL (ref ?–150)
VALPROATE SERPL-MCNC: 76.7 UG/ML (ref 50–100)

## 2020-02-06 ENCOUNTER — HOSPITAL ENCOUNTER (OUTPATIENT)
Dept: HOSPITAL 53 - SKLAB5 | Age: 65
End: 2020-02-06
Attending: FAMILY MEDICINE
Payer: MEDICARE

## 2020-02-06 DIAGNOSIS — D64.9: Primary | ICD-10-CM

## 2020-02-06 LAB
BASOPHILS # BLD AUTO: 0.1 10^3/UL (ref 0–0.2)
BASOPHILS NFR BLD AUTO: 0.4 % (ref 0–1)
EOSINOPHIL # BLD AUTO: 0.3 10^3/UL (ref 0–0.5)
EOSINOPHIL NFR BLD AUTO: 2.6 % (ref 0–3)
HCT VFR BLD AUTO: 35.2 % (ref 36–47)
HGB BLD-MCNC: 10.5 G/DL (ref 12–15.5)
LYMPHOCYTES # BLD AUTO: 3.1 10^3/UL (ref 1.5–5)
LYMPHOCYTES NFR BLD AUTO: 27 % (ref 24–44)
MCH RBC QN AUTO: 25.9 PG (ref 27–33)
MCHC RBC AUTO-ENTMCNC: 29.8 G/DL (ref 32–36.5)
MCV RBC AUTO: 86.7 FL (ref 80–96)
MONOCYTES # BLD AUTO: 0.8 10^3/UL (ref 0–0.8)
MONOCYTES NFR BLD AUTO: 6.6 % (ref 0–5)
NEUTROPHILS # BLD AUTO: 7.2 10^3/UL (ref 1.5–8.5)
NEUTROPHILS NFR BLD AUTO: 62.8 % (ref 36–66)
PLATELET # BLD AUTO: 392 10^3/UL (ref 150–450)
RBC # BLD AUTO: 4.06 10^6/UL (ref 4–5.4)
WBC # BLD AUTO: 11.4 10^3/UL (ref 4–10)

## 2020-02-23 ENCOUNTER — HOSPITAL ENCOUNTER (OUTPATIENT)
Dept: HOSPITAL 53 - SKLAB5 | Age: 65
End: 2020-02-23
Attending: FAMILY MEDICINE
Payer: MEDICARE

## 2020-02-23 DIAGNOSIS — R19.7: ICD-10-CM

## 2020-02-23 DIAGNOSIS — R11.2: Primary | ICD-10-CM

## 2020-03-04 ENCOUNTER — HOSPITAL ENCOUNTER (OUTPATIENT)
Dept: HOSPITAL 53 - SKLAB5 | Age: 65
End: 2020-03-04
Attending: FAMILY MEDICINE
Payer: MEDICARE

## 2020-03-04 DIAGNOSIS — R11.0: Primary | ICD-10-CM

## 2020-03-04 DIAGNOSIS — R10.9: ICD-10-CM

## 2020-03-04 DIAGNOSIS — R19.7: ICD-10-CM

## 2020-03-04 LAB
ALBUMIN SERPL BCG-MCNC: 2.8 GM/DL (ref 3.2–5.2)
ALT SERPL W P-5'-P-CCNC: 14 U/L (ref 12–78)
APPEARANCE UR: CLEAR
BACTERIA UR QL AUTO: NEGATIVE
BILIRUB SERPL-MCNC: 0.3 MG/DL (ref 0.2–1)
BILIRUB UR QL STRIP.AUTO: NEGATIVE
BUN SERPL-MCNC: 20 MG/DL (ref 7–18)
CALCIUM SERPL-MCNC: 9.5 MG/DL (ref 8.8–10.2)
CHLORIDE SERPL-SCNC: 101 MEQ/L (ref 98–107)
CO2 SERPL-SCNC: 28 MEQ/L (ref 21–32)
CREAT SERPL-MCNC: 0.64 MG/DL (ref 0.55–1.3)
FLUAV RNA UPPER RESP QL NAA+PROBE: NEGATIVE
FLUBV RNA UPPER RESP QL NAA+PROBE: NEGATIVE
GFR SERPL CREATININE-BSD FRML MDRD: > 60 ML/MIN/{1.73_M2} (ref 45–?)
GLUCOSE SERPL-MCNC: 115 MG/DL (ref 70–100)
GLUCOSE UR QL STRIP.AUTO: NEGATIVE MG/DL
HCT VFR BLD AUTO: 33.2 % (ref 36–47)
HGB BLD-MCNC: 10.2 G/DL (ref 12–15.5)
HGB UR QL STRIP.AUTO: NEGATIVE
KETONES UR QL STRIP.AUTO: NEGATIVE MG/DL
LEUKOCYTE ESTERASE UR QL STRIP.AUTO: NEGATIVE
LIPASE SERPL-CCNC: 43 U/L (ref 73–393)
MCH RBC QN AUTO: 25.7 PG (ref 27–33)
MCHC RBC AUTO-ENTMCNC: 30.7 G/DL (ref 32–36.5)
MCV RBC AUTO: 83.6 FL (ref 80–96)
NITRITE UR QL STRIP.AUTO: NEGATIVE
PH UR STRIP.AUTO: 5 UNITS (ref 5–9)
PLATELET # BLD AUTO: 366 10^3/UL (ref 150–450)
POTASSIUM SERPL-SCNC: 4.2 MEQ/L (ref 3.5–5.1)
PROT SERPL-MCNC: 8 GM/DL (ref 6.4–8.2)
PROT UR QL STRIP.AUTO: NEGATIVE MG/DL
RBC # BLD AUTO: 3.97 10^6/UL (ref 4–5.4)
RBC # UR AUTO: 3 /HPF (ref 0–3)
SODIUM SERPL-SCNC: 136 MEQ/L (ref 136–145)
SP GR UR STRIP.AUTO: 1.01 (ref 1–1.03)
SQUAMOUS #/AREA URNS AUTO: 1 /HPF (ref 0–6)
UROBILINOGEN UR QL STRIP.AUTO: 0.2 MG/DL (ref 0–2)
WBC # BLD AUTO: 11.6 10^3/UL (ref 4–10)
WBC #/AREA URNS AUTO: 6 /HPF (ref 0–3)

## 2020-03-04 NOTE — REP
ABDOMINAL SERIES:

 

Supine and erect views of the abdomen demonstrate no evidence of free

intraperitoneal air and no evidence of small bowel obstruction.  No significantly

dilated small bowel loops are seen.  Scattered vascular calcifications are noted.

There are mild degenerative changes of the spine with curvature towards the left.

Metallic internal fixation is seen in the proximal left femur.

 

An accompanying view of the chest demonstrates chronic interstitial fibrosis in

the lung bases.  The heart is upper limits of normal in size to slightly

enlarged.  There appears to be increased soft tissue in the left aortopulmonic

window and in the right inferior paratracheal region.  I cannot exclude

underlying adenopathy.

 

IMPRESSION:

 

No free air or obstruction.

 

Increased convex soft tissue density in the mediastinum. This suggests

mediastinal adenopathy.  Recommend CT of the chest with IV contrast.

 

 

Electronically Signed by

Raymond Albarran MD 03/04/2020 04:09 P

## 2020-03-05 ENCOUNTER — HOSPITAL ENCOUNTER (OUTPATIENT)
Dept: HOSPITAL 53 - SKLAB5 | Age: 65
End: 2020-03-05
Attending: FAMILY MEDICINE
Payer: MEDICARE

## 2020-03-05 DIAGNOSIS — D64.9: Primary | ICD-10-CM

## 2020-03-31 ENCOUNTER — HOSPITAL ENCOUNTER (OUTPATIENT)
Dept: HOSPITAL 53 - SKLAB5 | Age: 65
End: 2020-03-31
Attending: FAMILY MEDICINE
Payer: MEDICARE

## 2020-03-31 ENCOUNTER — HOSPITAL ENCOUNTER (OUTPATIENT)
Dept: HOSPITAL 53 - M ONCR | Age: 65
End: 2020-03-31
Attending: RADIOLOGY
Payer: MEDICARE

## 2020-03-31 DIAGNOSIS — C34.31: Primary | ICD-10-CM

## 2020-03-31 DIAGNOSIS — R09.89: ICD-10-CM

## 2020-03-31 DIAGNOSIS — Z53.9: Primary | ICD-10-CM

## 2020-03-31 DIAGNOSIS — R53.83: Primary | ICD-10-CM

## 2020-03-31 DIAGNOSIS — Z53.9: ICD-10-CM

## 2020-04-01 ENCOUNTER — HOSPITAL ENCOUNTER (OUTPATIENT)
Dept: HOSPITAL 53 - M ONCR | Age: 65
LOS: 29 days | End: 2020-04-30
Attending: RADIOLOGY
Payer: MEDICARE

## 2020-04-01 DIAGNOSIS — C34.31: Primary | ICD-10-CM

## 2020-04-01 NOTE — RADONC
RADIATION ONCOLOGY SIMULATION NOTE

 

DATE:  03/31/2020

 

CHART NUMBER:  

 

SIMULATION NOTE:

Ms. Almonte was taken to the CT simulator today for simulation of her

mediastinal field.  Simulation was accomplished without difficulty or discomfort.

Radiation treatment planning is underway and radiation treatments will begin

today.

 

An immobilization device was created without difficulty or discomfort.

 

I was physically present throughout this course of simulation.  Great care was

taken with COVID-19 precautions.

 

We are planning on delivering three fractions of 300 cGy each to the mediastinum.

The patient does not at this point have pathology unavailable.  Biopsy is

scheduled for tomorrow.  Once we obtain pathology further recommendations can be

made.

 

Clearly if the patient is a candidate for systemic therapy that would be the

preferred choice.

 

I am stopping at 900 cGy since there is an exorbitant amount of normal lung in

this massive field.  Further re-evaluation will be undertaken after completion of

these three fractions.

 

I did speak with the patient's daughter at length and she is deciding whether or

not to put this patient on comfort care.  She has agreed to two or three

fractions of radiation to obtain some stability while the decisions are made.

The patient has multiple comorbidities.

## 2020-04-01 NOTE — RADONC
RADIATION ONCOLOGY CONSULTATION NOTE

 

DATE OF SERVICE:  03/31/2020

 

CHART NUMBER:  

 

 

DIAGNOSIS:

Mediastinal mass.

 

ECOG PERFORMANCE STATUS:

4.

 

CONSULTATION NOTE:  We were contacted by the hospitalist service as well as the

emergency room department by Dr. Charanjit Jeff, as well as Dr. Gondal and Dr. Susu Coles for consultation regarding possibilities of emergent external beam

RT to an massive mediastinal mass causing SVC syndrome.

 

HISTORY OF PRESENT ILLNESS:

The patient is a long-term nursing care facility patient who recently developed

sudden shortness of breath with facial swelling and was seen in our emergency

department today.  A CT scan was done on 03/31/1928, which revealed bulky

mediastinal adenopathy in the right paratracheal region, the precarinal region,

the subcarinal region and the aorticopulmonary window.  The right paratracheal

adenopathy measured 8.6 cm x 6.5 cm x 7 cm.  The subcarinal lymph node measured

3.5 cm x 4.3 cm.  There was also left axillary lymphadenopathy and a mass in the

left right lower lobe measuring 2.8 cm.  In addition, there appeared to be celiac

axis lymph nodes which were enlarged.  This was consistent with widely metastatic

disease and SVC syndrome.  The superior vena cava was substantially compressed.

 

The patient was tested for COVID-19 virus as well.

 

PAST MEDICAL HISTORY:

The patient has an exceedingly lengthy past medical history with numerous

complaints. Past medical history includes anxiety disorder, atrial fibrillation,

bipolar disorder, blindness, chronic back pain, chronic diarrhea, chronic left

hip pain, chronic peripheral venous insufficiency, COPD, cough with hemoptysis,

right hemiparesis secondary to CVA, type 2 diabetes with diabetic neuropathy,

GERD, ETOH abuse, otitis media, hypertension, iron deficiency anemia, lower

extremity edema, cellulitis, pneumonia, pulmonary fibrosis, sciatica.  In

addition, the patient had bilateral hip replacements and cardiac catheterization

on 10/06/2008 at age 53, closed endoscopic biopsy of large intestine in 2006 at

age of 51, some type of drainage of the right lower arm in 2018, she had a wrist

abscess in 2018 as well.

 

ALLERGIES:

The patient is allergic to CIPRO and QUINOLONES.

 

FAMILY HISTORY:   Generally unremarkable.

 

REVIEW OF SYSTEMS:

The patient is lying in bed in the ICU.  She is not communicative.  She does say

she is hungry.

 

PHYSICAL EXAMINATION:

Physical examination was limited secondary to COVID-19 precautions.  The patient

is presenting on a stretcher from the ICU.  She has on a surgical mask.  She

appears comfortable, laying down.

 

ASSESSMENT

I did have a discussion with this patient's daughter with regards to our present

treatment plan, as well as with the referring hospitalist doctors.  I believe it

reasonable at this time to treat her mediastinal mass.  She does clearly have SVC

syndrome.  I am planning on delivering a dose of 300 cGy today, as well as an

additional 300 cGy tomorrow and perhaps a third one if necessary.

 

The patient is scheduled for a biopsy to be done tomorrow and pathology in this

case is critical.  Clearly, if this is a lymphoma or small cell lung carcinoma,

systemic therapy would be the treatment option of choice.  More than that, it

would really be the only treatment available.  She has poor pulmonary function

and one can clearly see the lung disease on her CT scan.  Her present fields

includes vast majority of both her lungs which cannot tolerate exceeding lung

tolerance levels.  If this is not a disease that can be treated systemically then

and the only option would be palliative care after these three fractions.

Indeed, this is appears to be widely metastatic and the goal of this would purely

be to palliate and buy some time.

 

The patient's daughter has explained that she is coming in and the family will

decide on palliative care.  The goal therefore of today's treatment and perhaps

tomorrow's is to allow some time and stability to make a decision on which way to

go with this patient.

 

I have discussed this case in length with Dr. Rodríguez as well.  Clearly, this

patient has had multiple issues and this will be a difficult and personal

decision for the family.  We are here to support them at least for the three

fractions that are safely able to be delivered.

 

 

 

cc:    Susu Coles MD, PGY-2

       David Flint, MD Khubaib Gondal, MD [place], MD

## 2020-04-02 ENCOUNTER — HOSPITAL ENCOUNTER (OUTPATIENT)
Dept: HOSPITAL 53 - SKLAB5 | Age: 65
End: 2020-04-02
Attending: FAMILY MEDICINE
Payer: MEDICARE

## 2020-04-02 DIAGNOSIS — E11.9: Primary | ICD-10-CM

## 2020-04-02 DIAGNOSIS — D64.9: ICD-10-CM

## 2020-04-02 LAB
BASOPHILS # BLD AUTO: 0 10^3/UL (ref 0–0.2)
BASOPHILS NFR BLD AUTO: 0.2 % (ref 0–1)
EOSINOPHIL # BLD AUTO: 0 10^3/UL (ref 0–0.5)
EOSINOPHIL NFR BLD AUTO: 0 % (ref 0–3)
HCT VFR BLD AUTO: 30.6 % (ref 36–47)
HGB BLD-MCNC: 9.1 G/DL (ref 12–15.5)
LYMPHOCYTES # BLD AUTO: 0.8 10^3/UL (ref 1.5–5)
LYMPHOCYTES NFR BLD AUTO: 6.5 % (ref 24–44)
MCH RBC QN AUTO: 25.1 PG (ref 27–33)
MCHC RBC AUTO-ENTMCNC: 29.7 G/DL (ref 32–36.5)
MCV RBC AUTO: 84.3 FL (ref 80–96)
MONOCYTES # BLD AUTO: 0.5 10^3/UL (ref 0–0.8)
MONOCYTES NFR BLD AUTO: 4 % (ref 0–5)
NEUTROPHILS # BLD AUTO: 11.3 10^3/UL (ref 1.5–8.5)
NEUTROPHILS NFR BLD AUTO: 88.8 % (ref 36–66)
PLATELET # BLD AUTO: 352 10^3/UL (ref 150–450)
RBC # BLD AUTO: 3.63 10^6/UL (ref 4–5.4)
WBC # BLD AUTO: 12.7 10^3/UL (ref 4–10)